# Patient Record
Sex: MALE | Race: WHITE | NOT HISPANIC OR LATINO | Employment: OTHER | URBAN - NONMETROPOLITAN AREA
[De-identification: names, ages, dates, MRNs, and addresses within clinical notes are randomized per-mention and may not be internally consistent; named-entity substitution may affect disease eponyms.]

---

## 2020-02-19 ENCOUNTER — OFFICE VISIT (OUTPATIENT)
Dept: CARDIOLOGY | Facility: CLINIC | Age: 55
End: 2020-02-19

## 2020-02-19 VITALS
HEART RATE: 60 BPM | BODY MASS INDEX: 33.08 KG/M2 | HEIGHT: 72 IN | SYSTOLIC BLOOD PRESSURE: 126 MMHG | RESPIRATION RATE: 16 BRPM | WEIGHT: 244.2 LBS | DIASTOLIC BLOOD PRESSURE: 79 MMHG

## 2020-02-19 DIAGNOSIS — R00.2 PALPITATIONS: Primary | ICD-10-CM

## 2020-02-19 DIAGNOSIS — R06.09 DYSPNEA ON EXERTION: ICD-10-CM

## 2020-02-19 DIAGNOSIS — R07.2 PRECORDIAL PAIN: ICD-10-CM

## 2020-02-19 PROCEDURE — 93000 ELECTROCARDIOGRAM COMPLETE: CPT | Performed by: INTERNAL MEDICINE

## 2020-02-19 PROCEDURE — 99204 OFFICE O/P NEW MOD 45 MIN: CPT | Performed by: INTERNAL MEDICINE

## 2020-02-19 RX ORDER — ASPIRIN 81 MG/1
81 TABLET, CHEWABLE ORAL DAILY
COMMUNITY

## 2020-02-19 RX ORDER — ZOLPIDEM TARTRATE 10 MG/1
10 TABLET ORAL NIGHTLY
COMMUNITY
End: 2022-03-22 | Stop reason: HOSPADM

## 2020-02-19 RX ORDER — ATENOLOL 25 MG/1
25 TABLET ORAL 2 TIMES DAILY
COMMUNITY
End: 2022-03-14 | Stop reason: ALTCHOICE

## 2020-02-19 RX ORDER — LISINOPRIL 10 MG/1
10 TABLET ORAL DAILY
COMMUNITY
End: 2020-02-19 | Stop reason: ALTCHOICE

## 2020-02-19 RX ORDER — NITROGLYCERIN 0.4 MG/1
0.4 TABLET SUBLINGUAL
COMMUNITY
End: 2022-03-14 | Stop reason: SDUPTHER

## 2020-02-19 NOTE — PROGRESS NOTES
Breezy Horn NP-C  Isa Priest  1965 02/19/2020    Dear  Breezy:    Subjective     Chief complaint: Recurrent palpitations with dizziness and increasing shortness of breath and some occasional chest pains.    Isa Priest is a 54 y.o. male with the problems as listed above, presents    History of Present Illness: Mr. De is a pleasant 53-year-old  male with no history of known coronary artery disease, has history of reportedly an episode of atrial fibrillation in 2007 at which time he was seen at hospital in [place].  He reportedly had cardiac arrest at that time and had to be resuscitated.  No further details of cardiac work-up are available to me at this time but patient apparently was told that he was okay and discharged home.  He apparently has done well since then with no further cardiac events.  However he has noticed shortness of breath since then which has reportedly gotten progressively worse recently to the point that he currently gets short of breath with mild to moderate exertion.  He denies any PND, orthopnea or pedal edema.  He denies any recent weight gain.  He also has noticed recent episodes of palpitations with rapid heartbeat associated with dizziness and one episode of sweating recently while he was driving.  He has no syncope as such.      He also has some intermittent left-sided chest pains which are mostly sharp that radiated to the left shoulder and left side of the neck.  These occur at random. He also has noticed that he would have 8 to 9 hours of sleep at night but feel tired when he wakes up in the morning.  He probably does snore as reported by his wife.  He is a non-smoker, nondiabetic and nonhypertensive.  He reportedly had cardiac catheterization in October 2018 at [facility] and [place] and was told that he did not have any blockages.  He does have a positive family history of premature coronary artery disease with his dad having had  coronary artery disease in his 50s and  in his 60s.  His grandfather reportedly  with heart attack in his 50s.  He admits to drinking 2 cups of coffee a day and occasional Coke.    Cardiac risk factors:Positive family Hx. of premature athersclerotivc disease. and Age and male gender.    Allergies   Allergen Reactions   • Penicillins Other (See Comments)     Reaction unknown, was advised by family    :    Current Outpatient Medications:   •  aspirin 81 MG chewable tablet, Chew 81 mg Daily., Disp: , Rfl:   •  atenolol (TENORMIN) 25 MG tablet, Take 25 mg by mouth 2 (Two) Times a Day., Disp: , Rfl:   •  nitroglycerin (NITROSTAT) 0.4 MG SL tablet, Place 0.4 mg under the tongue Every 5 (Five) Minutes As Needed. Take no more than 3 doses in 15 minutes., Disp: , Rfl:   •  sertraline (ZOLOFT) 50 MG tablet, Take 50 mg by mouth Daily., Disp: , Rfl:   •  ZOLPIDEM TARTRATE PO, Take 10 mg by mouth Every Night., Disp: , Rfl:     Past Medical History:   Diagnosis Date   • Anxiety    • Atrial fibrillation (CMS/HCC)    • Dizziness    • Hyperlipidemia    • Hypertension    • Insomnia    • Mitral valve prolapse    • Palpitations    • Precordial pain    • Sleep apnea      Past Surgical History:   Procedure Laterality Date   • CARDIAC CATHETERIZATION  1997    negative findings   • KNEE SURGERY     • SHOULDER SURGERY       Family History   Problem Relation Age of Onset   • Heart disease Father    • Heart attack Father    • Heart attack Maternal Grandfather    • Heart attack Paternal Grandfather      Social History     Tobacco Use   • Smoking status: Never Smoker   • Smokeless tobacco: Never Used   Substance Use Topics   • Alcohol use: Yes     Comment: social   • Drug use: Never       Review of Systems   Constitution: Positive for malaise/fatigue. Negative for chills, diaphoresis and fever.   HENT: Positive for odynophagia.    Eyes: Positive for visual disturbance.   Cardiovascular: Positive for chest pain and  "palpitations. Negative for leg swelling, orthopnea and paroxysmal nocturnal dyspnea.   Respiratory: Positive for shortness of breath. Negative for cough and hemoptysis.    Endocrine: Negative for cold intolerance and heat intolerance.   Hematologic/Lymphatic: Does not bruise/bleed easily.   Skin: Negative for rash.   Musculoskeletal: Positive for back pain. Negative for myalgias.   Gastrointestinal: Positive for abdominal pain, diarrhea and heartburn. Negative for constipation, nausea and vomiting.   Genitourinary: Negative for dysuria and hematuria.   Neurological: Positive for headaches and light-headedness. Negative for dizziness, focal weakness and numbness.   Psychiatric/Behavioral: The patient is nervous/anxious.    All other systems reviewed and are negative.      Objective   Blood pressure 126/79, pulse 60, resp. rate 16, height 182.9 cm (72\"), weight 111 kg (244 lb 3.2 oz).  Body mass index is 33.12 kg/m².        Physical Exam   Constitutional: He is oriented to person, place, and time. He appears well-developed and well-nourished.   HENT:   Mouth/Throat: Oropharynx is clear and moist.   Eyes: Pupils are equal, round, and reactive to light. EOM are normal.   Neck: Neck supple. No JVD present. No tracheal deviation present. No thyromegaly present.   Cardiovascular: Normal rate, regular rhythm, S1 normal and S2 normal. Exam reveals no gallop and no friction rub.   No murmur heard.  Pulmonary/Chest: Effort normal and breath sounds normal.   Abdominal: Soft. Bowel sounds are normal. He exhibits no mass. There is no tenderness.   Musculoskeletal: Normal range of motion. He exhibits no edema.   Lymphadenopathy:     He has no cervical adenopathy.   Neurological: He is alert and oriented to person, place, and time.   Skin: Skin is warm and dry. No rash noted.   Psychiatric: He has a normal mood and affect.       ECG 12 Lead  Date/Time: 2/19/2020 2:23 PM  Performed by: Bolivar Caldwell MD  Authorized by: Javi, " Bolivar BACA MD   Previous ECG: no previous ECG available  Rhythm: sinus rhythm  BPM: 60  Conduction: conduction normal  ST Segments: ST segments normal  T Waves: T waves normal    Clinical impression: normal ECG            Assessment/Plan :   Diagnosis Plan   1. Palpitations with rapid heartbeat.    2. Precordial pain (with some typical and some atypical features for angina pectoris)    3. Dyspnea on exertion(NYHA class II-III)       Recommendations:    Orders Placed This Encounter   Procedures   • Comprehensive Metabolic Panel   • TSH   • Magnesium   • Holter Monitor - 72 Hour Up To 21 Days and Stress echo   • ECG 12 Lead        1. We will evaluate his chest pains with a stress echo study  2. Will evaluate his palpitations with a 14-day Holter monitor.  3. I told him to try to avoid caffeinated beverages including coffee and pop.  4. Continue with aspirin and atenolol at current doses.  5. Check CMP and TSH.    Return in about 4 weeks (around 3/18/2020).    As always, Breezy  I appreciate very much the opportunity to participate in the cardiovascular care of your patients. Please do not hesitate to call me with any questions with regards to Isa Priest's evaluation and management.       With Best Regards,        Bolivar Caldwell MD, [facility]    Please note that portions of this note were completed with a voice recognition program.

## 2020-02-21 ENCOUNTER — TELEPHONE (OUTPATIENT)
Dept: CARDIOLOGY | Facility: CLINIC | Age: 55
End: 2020-02-21

## 2020-02-21 DIAGNOSIS — R00.2 PALPITATIONS: ICD-10-CM

## 2020-02-21 NOTE — TELEPHONE ENCOUNTER
14 day holter, [facility] [phone] does not require precert per Availity. Called to make pt aware-VM full, could not leave a message.  [employer] enrollment is queued.    Pt returned call. Pt aware and agreeable.

## 2020-02-24 ENCOUNTER — CLINICAL SUPPORT (OUTPATIENT)
Dept: CARDIOLOGY | Facility: CLINIC | Age: 55
End: 2020-02-24

## 2020-02-24 DIAGNOSIS — R00.2 PALPITATIONS: Primary | ICD-10-CM

## 2020-02-24 PROCEDURE — 0296T PR EXT ECG > 48HR TO 21 DAY RCRD W/CONECT INTL RCRD: CPT | Performed by: INTERNAL MEDICINE

## 2020-02-24 NOTE — PROGRESS NOTES
14 day holter monitor placement as ordered by Dr. Caldwell 2/19/20.  Pt instructions given per instructional booklet. Pt v/u and denies questions or concerns at this time.

## 2020-03-03 ENCOUNTER — HOSPITAL ENCOUNTER (OUTPATIENT)
Dept: CARDIOLOGY | Facility: HOSPITAL | Age: 55
Discharge: HOME | End: 2020-03-03

## 2020-03-03 DIAGNOSIS — R07.2 PRECORDIAL PAIN: ICD-10-CM

## 2020-03-03 PROCEDURE — 93350 STRESS TTE ONLY: CPT | Performed by: INTERNAL MEDICINE

## 2020-03-03 PROCEDURE — 93320 DOPPLER ECHO COMPLETE: CPT | Performed by: INTERNAL MEDICINE

## 2020-03-03 PROCEDURE — 93325 DOPPLER ECHO COLOR FLOW MAPG: CPT

## 2020-03-03 PROCEDURE — 93018 CV STRESS TEST I&R ONLY: CPT | Performed by: INTERNAL MEDICINE

## 2020-03-03 PROCEDURE — 93351 STRESS TTE COMPLETE: CPT

## 2020-03-03 PROCEDURE — 93325 DOPPLER ECHO COLOR FLOW MAPG: CPT | Performed by: INTERNAL MEDICINE

## 2020-03-03 PROCEDURE — 93320 DOPPLER ECHO COMPLETE: CPT

## 2020-03-05 LAB
BH CV ECHO MEAS - ACS: 2.1 CM
BH CV ECHO MEAS - AO ROOT AREA (BSA CORRECTED): 1.5
BH CV ECHO MEAS - AO ROOT AREA: 9.1 CM^2
BH CV ECHO MEAS - AO ROOT DIAM: 3.4 CM
BH CV ECHO MEAS - BSA(HAYCOCK): 2.4 M^2
BH CV ECHO MEAS - BSA: 2.3 M^2
BH CV ECHO MEAS - BZI_BMI: 33.1 KILOGRAMS/M^2
BH CV ECHO MEAS - BZI_METRIC_HEIGHT: 182.9 CM
BH CV ECHO MEAS - BZI_METRIC_WEIGHT: 110.7 KG
BH CV ECHO MEAS - EDV(CUBED): 70.4 ML
BH CV ECHO MEAS - EDV(MOD-SP4): 61.7 ML
BH CV ECHO MEAS - EDV(TEICH): 75.5 ML
BH CV ECHO MEAS - EF(CUBED): 12.5 %
BH CV ECHO MEAS - EF(MOD-SP4): 70.2 %
BH CV ECHO MEAS - EF(TEICH): 10 %
BH CV ECHO MEAS - ESV(CUBED): 61.6 ML
BH CV ECHO MEAS - ESV(MOD-SP4): 18.4 ML
BH CV ECHO MEAS - ESV(TEICH): 67.9 ML
BH CV ECHO MEAS - FS: 4.4 %
BH CV ECHO MEAS - IVS/LVPW: 0.86
BH CV ECHO MEAS - IVSD: 1 CM
BH CV ECHO MEAS - LA DIMENSION: 4.7 CM
BH CV ECHO MEAS - LA/AO: 1.4
BH CV ECHO MEAS - LV DIASTOLIC VOL/BSA (35-75): 26.6 ML/M^2
BH CV ECHO MEAS - LV MASS(C)D: 148.8 GRAMS
BH CV ECHO MEAS - LV MASS(C)DI: 64.2 GRAMS/M^2
BH CV ECHO MEAS - LV SYSTOLIC VOL/BSA (12-30): 7.9 ML/M^2
BH CV ECHO MEAS - LVIDD: 4.1 CM
BH CV ECHO MEAS - LVIDS: 4 CM
BH CV ECHO MEAS - LVLD AP4: 9.6 CM
BH CV ECHO MEAS - LVLS AP4: 8.2 CM
BH CV ECHO MEAS - LVOT AREA (M): 3.5 CM^2
BH CV ECHO MEAS - LVOT AREA: 3.5 CM^2
BH CV ECHO MEAS - LVOT DIAM: 2.1 CM
BH CV ECHO MEAS - LVPWD: 1.2 CM
BH CV ECHO MEAS - MV A MAX VEL: 72.8 CM/SEC
BH CV ECHO MEAS - MV E MAX VEL: 59.1 CM/SEC
BH CV ECHO MEAS - MV E/A: 0.81
BH CV ECHO MEAS - PA ACC TIME: 0.11 SEC
BH CV ECHO MEAS - PA PR(ACCEL): 31.3 MMHG
BH CV ECHO MEAS - SI(CUBED): 3.8 ML/M^2
BH CV ECHO MEAS - SI(MOD-SP4): 18.7 ML/M^2
BH CV ECHO MEAS - SI(TEICH): 3.3 ML/M^2
BH CV ECHO MEAS - SV(CUBED): 8.8 ML
BH CV ECHO MEAS - SV(MOD-SP4): 43.3 ML
BH CV ECHO MEAS - SV(TEICH): 7.6 ML
BH CV STRESS BP STAGE 1: NORMAL
BH CV STRESS BP STAGE 2: NORMAL
BH CV STRESS BP STAGE 3: NORMAL
BH CV STRESS DURATION MIN STAGE 1: 3
BH CV STRESS DURATION MIN STAGE 2: 3
BH CV STRESS DURATION MIN STAGE 3: 3
BH CV STRESS DURATION SEC STAGE 1: 0
BH CV STRESS DURATION SEC STAGE 2: 0
BH CV STRESS DURATION SEC STAGE 3: 0
BH CV STRESS ECHO POST STRESS EJECTION FRACTION EF: 75 %
BH CV STRESS GRADE STAGE 1: 10
BH CV STRESS GRADE STAGE 2: 12
BH CV STRESS GRADE STAGE 3: 14
BH CV STRESS HR STAGE 1: 106
BH CV STRESS HR STAGE 2: 128
BH CV STRESS HR STAGE 3: 144
BH CV STRESS METS STAGE 1: 5
BH CV STRESS METS STAGE 2: 7.5
BH CV STRESS METS STAGE 3: 10
BH CV STRESS PROTOCOL 1: NORMAL
BH CV STRESS RECOVERY BP: NORMAL MMHG
BH CV STRESS RECOVERY HR: 82 BPM
BH CV STRESS SPEED STAGE 1: 1.7
BH CV STRESS SPEED STAGE 2: 2.5
BH CV STRESS SPEED STAGE 3: 3.4
BH CV STRESS STAGE 1: 1
BH CV STRESS STAGE 2: 2
BH CV STRESS STAGE 3: 3
MAXIMAL PREDICTED HEART RATE: 165 BPM
PERCENT MAX PREDICTED HR: 87.27 %
STRESS BASELINE BP: NORMAL MMHG
STRESS BASELINE HR: 83 BPM
STRESS PERCENT HR: 103 %
STRESS POST ESTIMATED WORKLOAD: 10.1 METS
STRESS POST EXERCISE DUR MIN: 7 MIN
STRESS POST EXERCISE DUR SEC: 30 SEC
STRESS POST PEAK BP: NORMAL MMHG
STRESS POST PEAK HR: 144 BPM
STRESS TARGET HR: 140 BPM

## 2020-03-17 ENCOUNTER — TREATMENT (OUTPATIENT)
Dept: CARDIOLOGY | Facility: CLINIC | Age: 55
End: 2020-03-17

## 2020-03-17 DIAGNOSIS — R00.2 PALPITATIONS: Primary | ICD-10-CM

## 2020-03-17 PROCEDURE — 0298T PR EXT ECG > 48HR TO 21 DAY REVIEW AND INTERPRETATN: CPT | Performed by: INTERNAL MEDICINE

## 2020-09-15 ENCOUNTER — OFFICE VISIT (OUTPATIENT)
Dept: CARDIOLOGY | Facility: CLINIC | Age: 55
End: 2020-09-15

## 2020-09-15 VITALS
WEIGHT: 247 LBS | HEIGHT: 72 IN | BODY MASS INDEX: 33.46 KG/M2 | TEMPERATURE: 99 F | OXYGEN SATURATION: 96 % | HEART RATE: 66 BPM | SYSTOLIC BLOOD PRESSURE: 159 MMHG | DIASTOLIC BLOOD PRESSURE: 83 MMHG

## 2020-09-15 DIAGNOSIS — R55 SYNCOPE, UNSPECIFIED SYNCOPE TYPE: ICD-10-CM

## 2020-09-15 DIAGNOSIS — I10 ESSENTIAL HYPERTENSION: ICD-10-CM

## 2020-09-15 DIAGNOSIS — R00.2 PALPITATIONS: Primary | ICD-10-CM

## 2020-09-15 PROCEDURE — 99214 OFFICE O/P EST MOD 30 MIN: CPT | Performed by: NURSE PRACTITIONER

## 2020-09-15 PROCEDURE — 93000 ELECTROCARDIOGRAM COMPLETE: CPT | Performed by: NURSE PRACTITIONER

## 2020-09-15 NOTE — PROGRESS NOTES
Breezy Horn NP-C  Asim Priest  1965  09/15/2020    There is no problem list on file for this patient.      Dear Breezy Horn NP-C:    Subjective     Chief Complaint   Patient presents with   • Follow-up     6 mths f/up.    • Med Management   • Dizziness   • Palpitations   • Syncope           History of Present Illness:    Asim Priest is a 55 y.o. male with a past medical history significant for hypertension, reported history of atrial fibrillation, and recent near syncopal and syncopal episodes.  He was evaluated further with a stress echocardiogram which revealed normal LVEF with no significant valvular abnormality.  EKG stress portion did reveal 1 mm ST depression in the inferior lateral leads but the stress echo portion did not reveal any echocardiographic evidence of myocardial ischemia.  The patient also reports he had a completely unremarkable cardiac catheterization in 2018 and [place] at [facility].  He has been having near syncopal episodes.  These seem to occur with no exacerbating factor.  He feels dizzy and feels as if he is going to pass out.  He also feels his heart racing at the time.  He does report one episode of syncope in March 2020.  He did wear a 14-day Holter monitor which revealed predominantly normal sinus rhythm with one 5 beat run of ventricular tachycardia.  However, he reports he did not have any symptoms while wearing the monitor.  He has had a few episodes of near syncope and dizziness associated with rapid heartbeat since that time.  TSH, magnesium level, and BMP were ordered but not completed.          Allergies   Allergen Reactions   • Penicillins Other (See Comments)     Reaction unknown, was advised by family    :      Current Outpatient Medications:   •  aspirin 81 MG chewable tablet, Chew 81 mg Daily., Disp: , Rfl:   •  atenolol (TENORMIN) 25 MG tablet, Take 25 mg by mouth 2 (Two) Times a Day., Disp: , Rfl:   •  nitroglycerin  "(NITROSTAT) 0.4 MG SL tablet, Place 0.4 mg under the tongue Every 5 (Five) Minutes As Needed. Take no more than 3 doses in 15 minutes., Disp: , Rfl:   •  sertraline (ZOLOFT) 50 MG tablet, Take 50 mg by mouth Daily., Disp: , Rfl:   •  ZOLPIDEM TARTRATE PO, Take 10 mg by mouth Every Night., Disp: , Rfl:       The following portions of the patient's history were reviewed and updated as appropriate: allergies, current medications, past family history, past medical history, past social history, past surgical history and problem list.    Social History     Tobacco Use   • Smoking status: Never Smoker   • Smokeless tobacco: Never Used   Substance Use Topics   • Alcohol use: Yes     Comment: social   • Drug use: Never       Review of Systems   Constitution: Negative for decreased appetite and malaise/fatigue.   Cardiovascular: Positive for near-syncope, palpitations and syncope. Negative for chest pain, dyspnea on exertion, irregular heartbeat, leg swelling, orthopnea and paroxysmal nocturnal dyspnea.   Respiratory: Negative for cough, shortness of breath and wheezing.    Neurological: Negative for dizziness, light-headedness and weakness.       Objective   Vitals:    09/15/20 1520   BP: 159/83   BP Location: Left arm   Patient Position: Sitting   Cuff Size: Adult   Pulse: 66   Temp: 99 °F (37.2 °C)   TempSrc: Infrared   SpO2: 96%   Weight: 112 kg (247 lb)   Height: 182.9 cm (72\")     Body mass index is 33.5 kg/m².        Vitals signs reviewed.   Constitutional:       Appearance: Healthy appearance. Well-developed and not in distress.   HENT:      Head: Normocephalic and atraumatic.   Neck:      Vascular: JVD normal.   Pulmonary:      Effort: Pulmonary effort is normal.      Breath sounds: Normal breath sounds. No wheezing. No rales.   Cardiovascular:      Normal rate. Regular rhythm.      Murmurs: There is no murmur.      . No S3 and S4 gallop.   Edema:     Peripheral edema absent.   Abdominal:      General: Bowel sounds " are normal.      Palpations: Abdomen is soft.   Skin:     General: Skin is warm and dry.   Neurological:      Mental Status: Alert, oriented to person, place, and time and oriented to person, place and time.   Psychiatric:         Behavior: Behavior normal.                 ECG 12 Lead    Date/Time: 9/15/2020 3:13 PM  Performed by: Katie Mandel APRN  Authorized by: Katie Mandel APRN   Comparison: compared with previous ECG   Similar to previous ECG  Rhythm: sinus rhythm  BPM: 69                Assessment/Plan    Diagnosis Plan   1. Palpitations  Cardiac Event Monitor   2. Syncope, unspecified syncope type  Cardiac Event Monitor   3. Essential hypertension                  Recommendations:    1.  I have reviewed the stress echo and 14-day Holter results with the patient today.    2.  Since he continues to have near syncopal episodes and palpitations, will evaluate further with a 30-day event monitor.    3.  I have also asked him to complete the BMP, magnesium, and TSH level previously ordered.    4.  Continue with atenolol at the current dose.  He will monitor his blood pressure at home.    5.  Follow-up in 6 weeks or sooner if needed.      Return in about 6 weeks (around 10/27/2020) for Recheck.    As always, I appreciate very much the opportunity to participate in the cardiovascular care of your patients.      With Best Regards,    INDY Maldonado

## 2020-09-16 ENCOUNTER — TELEPHONE (OUTPATIENT)
Dept: CARDIOLOGY | Facility: CLINIC | Age: 55
End: 2020-09-16

## 2020-09-16 NOTE — TELEPHONE ENCOUNTER
Called pt to advise them they can come by the office any day in between 8 am and 11 am to have there monitor put on or we can mail it. If they want it mailed please confirm their address.     Advised pt and he will come in office.

## 2020-10-08 ENCOUNTER — TELEPHONE (OUTPATIENT)
Dept: CARDIOLOGY | Facility: CLINIC | Age: 55
End: 2020-10-08

## 2020-11-20 ENCOUNTER — TELEPHONE (OUTPATIENT)
Dept: CARDIOLOGY | Facility: CLINIC | Age: 55
End: 2020-11-20

## 2020-11-20 NOTE — TELEPHONE ENCOUNTER
Called pt to see if was going to have the monitor done. He stated that he is feeling a lot better with the med change and wants to hold off on the monitor for now.

## 2021-02-06 ENCOUNTER — HOSPITAL ENCOUNTER (EMERGENCY)
Dept: HOSPITAL 79 - ER1 | Age: 56
Discharge: LEFT BEFORE DISCHARGE | End: 2021-02-06
Payer: COMMERCIAL

## 2021-02-06 VITALS — WEIGHT: 241 LBS | BODY MASS INDEX: 32.64 KG/M2 | HEIGHT: 72 IN

## 2021-02-06 DIAGNOSIS — Z88.8: ICD-10-CM

## 2021-02-06 DIAGNOSIS — Z53.20: ICD-10-CM

## 2021-02-06 DIAGNOSIS — Z88.0: ICD-10-CM

## 2021-02-06 DIAGNOSIS — R07.89: Primary | ICD-10-CM

## 2021-02-06 DIAGNOSIS — I48.91: ICD-10-CM

## 2021-02-06 DIAGNOSIS — R06.02: ICD-10-CM

## 2021-02-06 DIAGNOSIS — R11.0: ICD-10-CM

## 2021-02-06 DIAGNOSIS — R00.2: ICD-10-CM

## 2021-02-06 DIAGNOSIS — I10: ICD-10-CM

## 2021-02-06 DIAGNOSIS — Z20.822: ICD-10-CM

## 2021-02-06 DIAGNOSIS — R42: ICD-10-CM

## 2021-02-06 LAB
BUN/CREATININE RATIO: 14 (ref 0–10)
HGB BLD-MCNC: 16.1 GM/DL (ref 14–17.5)
RED BLOOD COUNT: 5.07 M/UL (ref 4.2–5.5)
WHITE BLOOD COUNT: 7.9 K/UL (ref 4.5–11)

## 2021-02-06 PROCEDURE — U0002 COVID-19 LAB TEST NON-CDC: HCPCS

## 2022-03-09 NOTE — PROGRESS NOTES
[facility] Cardiology  [address], [address]  [place], [place], [address] [phone]  [email or web address]           OUTPATIENT CLINIC CONSULTATION NOTE    Patient care team:  Patient Care Team:  Breezy Horn NP-C as PCP - General (Nurse Practitioner)  Bertram Coley MD as Consulting Physician (Cardiology)    Requesting Provider and Reason for consultation: The patient is being seen today at the request of FIDENCIO Blancas for hypertension, Afib.     Subjective:   Chief complaint:   Chief Complaint   Patient presents with   • Hypertension       HPI:    Asim Priest is a 56 y.o. male.  Cardiac focused problem list:  1. Atrial fibrillation  a. 1 transient episode  that was associated with syncope and reportedly had a cardiac arrest and needed to be resuscitated  b. Heart monitor in [place] in 2018 with no known results  2. History of remote heart catheterization approximately , reportedly negative, data deficit  3. Family history of premature CAD with his father having his first MI in his 50s, Grandfather  of heart attack in his 50s, uncles with premature CAD  4. Hypertension  5. Syncope  6. Generalized anxiety disorder  7. Insomnia  8. Covid infection 2022    Patient presents today for consultation.     Since having Covid in 2022, he has experienced increased mild to moderate palpitations and fatigue.  Associated with emotional stress.  Occasional left and central sided chest pains that lasts a few minutes.  Denies tobacco use.  Drinks 4-5 alcoholic drinks a day several days a week.  Does not exercise regularly.    Review of Systems:  As noted above in the HPI    PFSH:  There is no problem list on file for this patient.        Current Outpatient Medications:   •  aspirin 81 MG chewable tablet, Chew 81 mg Daily., Disp: , Rfl:   •  metoprolol succinate XL (TOPROL-XL) 50 MG 24 hr tablet, Take 1 tablet by mouth Daily., Disp: , Rfl:   •   "nitroglycerin (NITROSTAT) 0.4 MG SL tablet, Place 1 tablet under the tongue Every 5 (Five) Minutes As Needed (chest pain). Take no more than 3 doses in 15 minutes., Disp: 25 tablet, Rfl: 3  •  sertraline (ZOLOFT) 50 MG tablet, Take 50 mg by mouth Daily., Disp: , Rfl:   •  ZOLPIDEM TARTRATE PO, Take 10 mg by mouth Every Night., Disp: , Rfl:     Allergies   Allergen Reactions   • Penicillins Other (See Comments)     Reaction unknown, was advised by family        Social History     Socioeconomic History   • Marital status:    Tobacco Use   • Smoking status: Never Smoker   • Smokeless tobacco: Never Used   Substance and Sexual Activity   • Alcohol use: Yes     Comment: social   • Drug use: Never     Family History   Problem Relation Age of Onset   • Arrhythmia Mother    • Heart disease Father    • Heart attack Father    • Heart attack Maternal Grandfather    • Heart attack Paternal Grandfather          Objective:   Physical Exam:  /82 (BP Location: Left arm, Patient Position: Sitting)   Pulse 67   Ht 182.9 cm (72\")   Wt 111 kg (244 lb)   SpO2 98%   BMI 33.09 kg/m²   CONSTITUTIONAL: No acute distress  RESPIRATORY: Normal effort. Clear to auscultation bilaterally without wheezing or rales  CARDIOVASCULAR: Regular rate and rhythm with normal S1 and S2. Without murmur, gallop or rub.  PERIPHERAL VASCULAR: Normal radial pulse. There is no lower extremity edema bilaterally.      3/2022 exam: 2+ PT pulses bilaterally.      Labs:  Labs reviewed by myself  No results found for: BUN, CREATININE, K, ALT, AST    No results found for: CHOL  No results found for: TRIG  No results found for: HDL  No results found for: LDL  No components found for: LDLDIRECTC    Diagnostic Data:      ECG 12 Lead    Date/Time: 3/14/2022 12:05 PM  Performed by: Bertram Coley MD  Authorized by: Bertram Coley MD   Comparison: compared with previous ECG from 9/15/2020  Similar to previous ECG  Rhythm: sinus rhythm            Results for " orders placed during the hospital encounter of 03/03/20    Adult Stress Echo W/ Cont or Stress Agent if Necessary Per Protocol    Interpretation Summary  · Resting Echocardiogram Findings:  · Normal left ventricular cavity size and wall thickness noted. All left ventricular wall segments contract normally.  · Estimated EF appears to be in the range of 61 - 65%.  · The valve appears trileaflet. The aortic valve is abnormal in structure. The valve exhibits sclerosis. No aortic valve regurgitation is present. No aortic valve stenosis is present  · The mitral valve is normal in structure. No mitral valve regurgitation is present. No significant mitral valve stenosis is present.  · Tricuspid valve not well visualized. The tricuspid valve is grossly normal. No tricuspid valve regurgitation is present.  · There is no evidence of pericardial effusion.  · Stress Procedure:  · A stress test was performed following the Omi protocol.  · Exercise duration (min) 7 min Exercise duration (sec) 30 sec Estimated workload 10.1 METS  · Baseline Vitals Baseline HR 83 bpm Baseline /98 mmHg Peak Stress Vitals Peak  bpm Peak /92 mmHg Recovery Vitals Recovery HR 82 bpm Recovery /102 mmHg Exercise Data Target HR (85%) 140 bpm Max. Pred. HR (100%) 165 bpm Percent Max Pred HR 87.27 %  · A horizontal ST segment depression of 1 mm in the inferolateral leads was noted during stress (II, III, aVF, V5, V6, V4 and V3), and returning to baseline after less than 1 minute of recovery.  · There were no arrhythmias during stress.  · Abnormal with interpretable ST segment stress ECG interpretation.  · Stress Echo Findings:  · Segment augmentation had a normal response to stress  · Cavity size behaved normally in response to stress. Left ventricular function is normal. Septal wall motion is normal  · Normal stress echo with no significant echocardiographic evidence for myocardial ischemia.    Stress test 3/2020  · Resting  Echocardiogram Findings:  · Normal left ventricular cavity size and wall thickness noted. All left ventricular wall segments contract normally.  · Estimated EF appears to be in the range of 61 - 65%.  · The valve appears trileaflet. The aortic valve is abnormal in structure. The valve exhibits sclerosis. No aortic valve regurgitation is present. No aortic valve stenosis is present  · The mitral valve is normal in structure. No mitral valve regurgitation is present. No significant mitral valve stenosis is present.  · Tricuspid valve not well visualized. The tricuspid valve is grossly normal. No tricuspid valve regurgitation is present.  · There is no evidence of pericardial effusion.  · Stress Procedure:  · A stress test was performed following the Omi protocol.  · Exercise duration (min) 7 min Exercise duration (sec) 30 sec Estimated workload 10.1 METS  · Baseline Vitals Baseline HR 83 bpm Baseline /98 mmHg Peak Stress Vitals Peak  bpm Peak /92 mmHg Recovery Vitals Recovery HR 82 bpm Recovery /102 mmHg Exercise Data Target HR (85%) 140 bpm Max. Pred. HR (100%) 165 bpm Percent Max Pred HR 87.27 %  · A horizontal ST segment depression of 1 mm in the inferolateral leads was noted during stress (II, III, aVF, V5, V6, V4 and V3), and returning to baseline after less than 1 minute of recovery.  · There were no arrhythmias during stress.  · Abnormal with interpretable ST segment stress ECG interpretation.  · Stress Echo Findings:  · Segment augmentation had a normal response to stress  · Cavity size behaved normally in response to stress. Left ventricular function is normal. Septal wall motion is normal  · Normal stress echo with no significant echocardiographic evidence for myocardial ischemia.    Holter monitor 2/2020  · Predominantly sinus rhythm with heart rate ranging from  bpm.  · Occasional PVCs with one 5 beat run of VT at a rate of 150 bpm on 3/04/2020 at 3:04 AM.  · Occasional  PACs.  No SVT.  · No significant AV block or long pauses.    Assessment and Plan:     Isolated episode of atrial fibrillation, 2007  Palpitations  -14-day heart monitor  -We will obtain most recent labs from PCP drawn a month ago  -Advised increased exercise, decreased caffeine and sugar, decreased alcohol use  -Continue metoprolol    Precordial pain  -Multiple risk factors for CAD  -Exercise nuclear stress test for ongoing CCS class II-III chest pain  -Continue aspirin, metoprolol.  Refilled nitro sublingual as needed    - Return in about 6 months (around 9/14/2022) for Next scheduled follow up, or sooner if needed.      Electronically signed by Bertram Cloey MD, 03/14/22, 12:05 PM EDT.

## 2022-03-14 ENCOUNTER — OFFICE VISIT (OUTPATIENT)
Dept: CARDIOLOGY | Facility: CLINIC | Age: 57
End: 2022-03-14

## 2022-03-14 ENCOUNTER — PATIENT ROUNDING (BHMG ONLY) (OUTPATIENT)
Dept: CARDIOLOGY | Facility: CLINIC | Age: 57
End: 2022-03-14

## 2022-03-14 VITALS
BODY MASS INDEX: 33.05 KG/M2 | HEIGHT: 72 IN | SYSTOLIC BLOOD PRESSURE: 138 MMHG | OXYGEN SATURATION: 98 % | WEIGHT: 244 LBS | HEART RATE: 67 BPM | DIASTOLIC BLOOD PRESSURE: 82 MMHG

## 2022-03-14 DIAGNOSIS — I48.0 PAROXYSMAL ATRIAL FIBRILLATION: Primary | ICD-10-CM

## 2022-03-14 DIAGNOSIS — R07.2 PRECORDIAL PAIN: ICD-10-CM

## 2022-03-14 DIAGNOSIS — R00.2 PALPITATIONS: ICD-10-CM

## 2022-03-14 PROCEDURE — 99214 OFFICE O/P EST MOD 30 MIN: CPT | Performed by: INTERNAL MEDICINE

## 2022-03-14 PROCEDURE — 93000 ELECTROCARDIOGRAM COMPLETE: CPT | Performed by: INTERNAL MEDICINE

## 2022-03-14 RX ORDER — METOPROLOL SUCCINATE 50 MG/1
50 TABLET, EXTENDED RELEASE ORAL DAILY
COMMUNITY
Start: 2022-03-07

## 2022-03-14 RX ORDER — NITROGLYCERIN 0.4 MG/1
0.4 TABLET SUBLINGUAL
Qty: 25 TABLET | Refills: 3 | Status: SHIPPED | OUTPATIENT
Start: 2022-03-14

## 2022-03-14 NOTE — PROGRESS NOTES
"March 14, 2022    Hello, may I speak with Asim Priest?  \"Yes\"    My name is Fani DEL CASTILLO.     I am  with [facility] CARDIOLOGY  [address]  [place] [address] [phone].    Before we get started may I verify your date of birth? 1965, \"Yes, correct\"    I am calling to officially welcome you to our practice and ask about your recent visit. Is this a good time to talk? \"Yes\"    Tell me about your visit with us. What things went well?  \"Everything\"       We're always looking for ways to make our patients' experiences even better. Do you have recommendations on ways we may improve?  \"No, doing a good job with everything.\"    Overall were you satisfied with your first visit to our practice? \"Yes\"       I appreciate you taking the time to speak with me today. Is there anything else I can do for you? \"No\"      Thank you, and have a great day.      "

## 2022-03-21 ENCOUNTER — HOSPITAL ENCOUNTER (INPATIENT)
Facility: HOSPITAL | Age: 57
LOS: 1 days | Discharge: HOME | End: 2022-03-22
Attending: EMERGENCY MEDICINE | Admitting: INTERNAL MEDICINE

## 2022-03-21 ENCOUNTER — APPOINTMENT (OUTPATIENT)
Dept: GENERAL RADIOLOGY | Facility: HOSPITAL | Age: 57
End: 2022-03-21

## 2022-03-21 DIAGNOSIS — R77.8 ELEVATED TROPONIN: ICD-10-CM

## 2022-03-21 DIAGNOSIS — I48.0 PAROXYSMAL ATRIAL FIBRILLATION WITH RAPID VENTRICULAR RESPONSE: Primary | ICD-10-CM

## 2022-03-21 DIAGNOSIS — Z86.79 HISTORY OF HYPERTENSION: ICD-10-CM

## 2022-03-21 DIAGNOSIS — R07.9 CHEST PAIN, UNSPECIFIED TYPE: ICD-10-CM

## 2022-03-21 DIAGNOSIS — R55 NEAR SYNCOPE: ICD-10-CM

## 2022-03-21 DIAGNOSIS — Z86.39 HISTORY OF HYPERLIPIDEMIA: ICD-10-CM

## 2022-03-21 PROBLEM — I10 HTN (HYPERTENSION): Status: ACTIVE | Noted: 2022-03-21

## 2022-03-21 PROBLEM — R79.89 ELEVATED TROPONIN: Status: ACTIVE | Noted: 2022-03-21

## 2022-03-21 PROBLEM — I47.10 SVT (SUPRAVENTRICULAR TACHYCARDIA): Status: ACTIVE | Noted: 2022-03-21

## 2022-03-21 PROBLEM — R09.02 HYPOXIA: Status: ACTIVE | Noted: 2022-03-21

## 2022-03-21 PROBLEM — E78.5 HLD (HYPERLIPIDEMIA): Status: ACTIVE | Noted: 2022-03-21

## 2022-03-21 PROBLEM — I47.1 SVT (SUPRAVENTRICULAR TACHYCARDIA): Status: ACTIVE | Noted: 2022-03-21

## 2022-03-21 PROBLEM — I48.91 A-FIB: Status: ACTIVE | Noted: 2022-03-21

## 2022-03-21 PROBLEM — G47.33 OSA (OBSTRUCTIVE SLEEP APNEA): Status: ACTIVE | Noted: 2022-03-21

## 2022-03-21 LAB
ALBUMIN SERPL-MCNC: 4.8 G/DL (ref 3.5–5.2)
ALBUMIN/GLOB SERPL: 1.9 G/DL
ALP SERPL-CCNC: 81 U/L (ref 39–117)
ALT SERPL W P-5'-P-CCNC: 31 U/L (ref 1–41)
ANION GAP SERPL CALCULATED.3IONS-SCNC: 12 MMOL/L (ref 5–15)
AST SERPL-CCNC: 24 U/L (ref 1–40)
BASOPHILS # BLD AUTO: 0.05 10*3/MM3 (ref 0–0.2)
BASOPHILS NFR BLD AUTO: 0.5 % (ref 0–1.5)
BILIRUB SERPL-MCNC: 0.5 MG/DL (ref 0–1.2)
BUN SERPL-MCNC: 13 MG/DL (ref 6–20)
BUN/CREAT SERPL: 13 (ref 7–25)
CALCIUM SPEC-SCNC: 9.5 MG/DL (ref 8.6–10.5)
CHLORIDE SERPL-SCNC: 102 MMOL/L (ref 98–107)
CO2 SERPL-SCNC: 22 MMOL/L (ref 22–29)
CREAT SERPL-MCNC: 1 MG/DL (ref 0.76–1.27)
DEPRECATED RDW RBC AUTO: 41.8 FL (ref 37–54)
EGFRCR SERPLBLD CKD-EPI 2021: 87.8 ML/MIN/1.73
EOSINOPHIL # BLD AUTO: 0.09 10*3/MM3 (ref 0–0.4)
EOSINOPHIL NFR BLD AUTO: 0.9 % (ref 0.3–6.2)
ERYTHROCYTE [DISTWIDTH] IN BLOOD BY AUTOMATED COUNT: 13.2 % (ref 12.3–15.4)
FLUAV RNA RESP QL NAA+PROBE: NOT DETECTED
FLUBV RNA RESP QL NAA+PROBE: NOT DETECTED
GLOBULIN UR ELPH-MCNC: 2.5 GM/DL
GLUCOSE SERPL-MCNC: 109 MG/DL (ref 65–99)
HCT VFR BLD AUTO: 43.1 % (ref 37.5–51)
HGB BLD-MCNC: 15 G/DL (ref 13–17.7)
HOLD SPECIMEN: NORMAL
IMM GRANULOCYTES # BLD AUTO: 0.03 10*3/MM3 (ref 0–0.05)
IMM GRANULOCYTES NFR BLD AUTO: 0.3 % (ref 0–0.5)
LIPASE SERPL-CCNC: 27 U/L (ref 13–60)
LYMPHOCYTES # BLD AUTO: 1.45 10*3/MM3 (ref 0.7–3.1)
LYMPHOCYTES NFR BLD AUTO: 15.3 % (ref 19.6–45.3)
MCH RBC QN AUTO: 30.5 PG (ref 26.6–33)
MCHC RBC AUTO-ENTMCNC: 34.8 G/DL (ref 31.5–35.7)
MCV RBC AUTO: 87.6 FL (ref 79–97)
MONOCYTES # BLD AUTO: 0.48 10*3/MM3 (ref 0.1–0.9)
MONOCYTES NFR BLD AUTO: 5.1 % (ref 5–12)
NEUTROPHILS NFR BLD AUTO: 7.39 10*3/MM3 (ref 1.7–7)
NEUTROPHILS NFR BLD AUTO: 77.9 % (ref 42.7–76)
NRBC BLD AUTO-RTO: 0 /100 WBC (ref 0–0.2)
NT-PROBNP SERPL-MCNC: 132.8 PG/ML (ref 0–900)
PLATELET # BLD AUTO: 166 10*3/MM3 (ref 140–450)
PMV BLD AUTO: 9.4 FL (ref 6–12)
POTASSIUM SERPL-SCNC: 4.1 MMOL/L (ref 3.5–5.2)
PROT SERPL-MCNC: 7.3 G/DL (ref 6–8.5)
QT INTERVAL: 238 MS
QT INTERVAL: 348 MS
QTC INTERVAL: 431 MS
QTC INTERVAL: 462 MS
RBC # BLD AUTO: 4.92 10*6/MM3 (ref 4.14–5.8)
SARS-COV-2 RNA RESP QL NAA+PROBE: NOT DETECTED
SODIUM SERPL-SCNC: 136 MMOL/L (ref 136–145)
TROPONIN T SERPL-MCNC: 0.05 NG/ML (ref 0–0.03)
TROPONIN T SERPL-MCNC: 0.12 NG/ML (ref 0–0.03)
WBC NRBC COR # BLD: 9.49 10*3/MM3 (ref 3.4–10.8)
WHOLE BLOOD HOLD SPECIMEN: NORMAL
WHOLE BLOOD HOLD SPECIMEN: NORMAL

## 2022-03-21 PROCEDURE — 25010000002 ENOXAPARIN PER 10 MG: Performed by: EMERGENCY MEDICINE

## 2022-03-21 PROCEDURE — 85025 COMPLETE CBC W/AUTO DIFF WBC: CPT | Performed by: EMERGENCY MEDICINE

## 2022-03-21 PROCEDURE — 93005 ELECTROCARDIOGRAM TRACING: CPT | Performed by: NURSE PRACTITIONER

## 2022-03-21 PROCEDURE — 83880 ASSAY OF NATRIURETIC PEPTIDE: CPT | Performed by: EMERGENCY MEDICINE

## 2022-03-21 PROCEDURE — 80053 COMPREHEN METABOLIC PANEL: CPT | Performed by: EMERGENCY MEDICINE

## 2022-03-21 PROCEDURE — 93005 ELECTROCARDIOGRAM TRACING: CPT | Performed by: EMERGENCY MEDICINE

## 2022-03-21 PROCEDURE — 92960 CARDIOVERSION ELECTRIC EXT: CPT

## 2022-03-21 PROCEDURE — 5A2204Z RESTORATION OF CARDIAC RHYTHM, SINGLE: ICD-10-PCS | Performed by: INTERNAL MEDICINE

## 2022-03-21 PROCEDURE — 83690 ASSAY OF LIPASE: CPT | Performed by: EMERGENCY MEDICINE

## 2022-03-21 PROCEDURE — 99223 1ST HOSP IP/OBS HIGH 75: CPT | Performed by: INTERNAL MEDICINE

## 2022-03-21 PROCEDURE — 84484 ASSAY OF TROPONIN QUANT: CPT | Performed by: EMERGENCY MEDICINE

## 2022-03-21 PROCEDURE — 99284 EMERGENCY DEPT VISIT MOD MDM: CPT

## 2022-03-21 PROCEDURE — 71045 X-RAY EXAM CHEST 1 VIEW: CPT

## 2022-03-21 PROCEDURE — 87636 SARSCOV2 & INF A&B AMP PRB: CPT | Performed by: INTERNAL MEDICINE

## 2022-03-21 PROCEDURE — 84484 ASSAY OF TROPONIN QUANT: CPT | Performed by: NURSE PRACTITIONER

## 2022-03-21 RX ORDER — METOPROLOL SUCCINATE 50 MG/1
50 TABLET, EXTENDED RELEASE ORAL DAILY
Status: DISCONTINUED | OUTPATIENT
Start: 2022-03-22 | End: 2022-03-22 | Stop reason: HOSPADM

## 2022-03-21 RX ORDER — METOPROLOL TARTRATE 5 MG/5ML
5 INJECTION INTRAVENOUS ONCE
Status: COMPLETED | OUTPATIENT
Start: 2022-03-21 | End: 2022-03-21

## 2022-03-21 RX ORDER — SODIUM CHLORIDE 0.9 % (FLUSH) 0.9 %
10 SYRINGE (ML) INJECTION AS NEEDED
Status: DISCONTINUED | OUTPATIENT
Start: 2022-03-21 | End: 2022-03-22 | Stop reason: HOSPADM

## 2022-03-21 RX ORDER — SODIUM CHLORIDE 0.9 % (FLUSH) 0.9 %
10 SYRINGE (ML) INJECTION EVERY 12 HOURS SCHEDULED
Status: DISCONTINUED | OUTPATIENT
Start: 2022-03-22 | End: 2022-03-22 | Stop reason: HOSPADM

## 2022-03-21 RX ORDER — ASPIRIN 81 MG/1
81 TABLET, CHEWABLE ORAL DAILY
Status: DISCONTINUED | OUTPATIENT
Start: 2022-03-22 | End: 2022-03-22 | Stop reason: HOSPADM

## 2022-03-21 RX ORDER — ASPIRIN 81 MG/1
324 TABLET, CHEWABLE ORAL ONCE
Status: COMPLETED | OUTPATIENT
Start: 2022-03-21 | End: 2022-03-21

## 2022-03-21 RX ORDER — CHOLECALCIFEROL (VITAMIN D3) 125 MCG
5 CAPSULE ORAL NIGHTLY PRN
Status: DISCONTINUED | OUTPATIENT
Start: 2022-03-21 | End: 2022-03-22 | Stop reason: HOSPADM

## 2022-03-21 RX ORDER — NITROGLYCERIN 0.4 MG/1
0.4 TABLET SUBLINGUAL
Status: DISCONTINUED | OUTPATIENT
Start: 2022-03-21 | End: 2022-03-22 | Stop reason: HOSPADM

## 2022-03-21 RX ADMIN — METHOHEXITAL SODIUM 20 MG: 500 INJECTION, POWDER, LYOPHILIZED, FOR SOLUTION INTRAMUSCULAR; INTRAVENOUS; RECTAL at 16:26

## 2022-03-21 RX ADMIN — ENOXAPARIN SODIUM 110 MG: 120 INJECTION SUBCUTANEOUS at 19:33

## 2022-03-21 RX ADMIN — Medication 10 ML: at 23:31

## 2022-03-21 RX ADMIN — METOPROLOL TARTRATE 25 MG: 25 TABLET, FILM COATED ORAL at 19:33

## 2022-03-21 RX ADMIN — METOPROLOL TARTRATE 5 MG: 5 INJECTION INTRAVENOUS at 16:33

## 2022-03-21 RX ADMIN — ASPIRIN 81 MG 324 MG: 81 TABLET ORAL at 19:33

## 2022-03-21 RX ADMIN — METHOHEXITAL SODIUM 80 MG: 500 INJECTION, POWDER, LYOPHILIZED, FOR SOLUTION INTRAMUSCULAR; INTRAVENOUS; RECTAL at 16:24

## 2022-03-21 NOTE — ED PROVIDER NOTES
Subjective   The patient presents to the emergency department with chest pain/tightness, palpitations, diaphoresis, weakness, and near syncope.  Patient has a history of paroxysmal atrial fibrillation.  Patient is currently wearing a monitor and he hit the button and they notified him that he was in A. fib and come to the ER.  Patient is currently on aspirin.  He reports the symptoms started about 2 hours ago.  They were sudden onset and he can tell exactly when it started.  He was working on a forklift and nearly passed out.  Patient arrives significantly symptomatic with diaphoresis, chest tightness, and generalized weakness.      History provided by:  Patient and relative      Review of Systems   Constitutional: Positive for fatigue.   Respiratory: Positive for chest tightness and shortness of breath.    Cardiovascular: Positive for palpitations.   Neurological: Positive for weakness.       Past Medical History:   Diagnosis Date   • Anxiety    • Atrial fibrillation (HCC) 2007   • Dizziness    • Hyperlipidemia    • Hypertension    • Insomnia    • Mitral valve prolapse 2000   • Palpitations    • Precordial pain    • Sleep apnea        Allergies   Allergen Reactions   • Penicillins Other (See Comments)     Reaction unknown, was advised by family        Past Surgical History:   Procedure Laterality Date   • CARDIAC CATHETERIZATION  2018, 1997    negative findings   • KNEE SURGERY     • SHOULDER SURGERY         Family History   Problem Relation Age of Onset   • Arrhythmia Mother    • Heart disease Father    • Heart attack Father    • Heart attack Maternal Grandfather    • Heart attack Paternal Grandfather        Social History     Socioeconomic History   • Marital status:    Tobacco Use   • Smoking status: Never Smoker   • Smokeless tobacco: Never Used   Substance and Sexual Activity   • Alcohol use: Yes     Comment: social   • Drug use: Never           Objective   Physical Exam  Vitals and nursing note  reviewed.   Constitutional:       General: He is in acute distress.      Appearance: He is obese. He is ill-appearing and diaphoretic.   HENT:      Head: Normocephalic and atraumatic.   Cardiovascular:      Rate and Rhythm: Tachycardia present.      Pulses:           Radial pulses are 1+ on the right side and 1+ on the left side.   Pulmonary:      Effort: Tachypnea present.   Abdominal:      Palpations: Abdomen is soft.      Tenderness: There is no abdominal tenderness.   Musculoskeletal:      Right lower leg: No edema.      Left lower leg: No edema.   Skin:     Comments: Skin is quite diaphoretic and pale.   Neurological:      Mental Status: He is alert and oriented to person, place, and time.         Electrical Cardioversion    Date/Time: 3/21/2022 4:33 PM  Performed by: Elvin Lujan MD  Authorized by: Elvin Lujan MD     Consent:     Consent obtained:  Emergent situation and verbal    Consent given by:  Patient    Risks, benefits, and alternatives were discussed: yes      Risks discussed:  Cutaneous burn, death, induced arrhythmia and pain  Universal protocol:     Procedure explained and questions answered to patient or proxy's satisfaction: yes      Immediately prior to procedure, a time out was called: yes      Patient identity confirmed:  Verbally with patient and arm band  Pre-procedure details:     Cardioversion basis:  Emergent    Rhythm:  Atrial fibrillation    Electrode placement:  Anterior-posterior  Attempt one:     Cardioversion mode:  Synchronous    Waveform:  Biphasic    Shock (Joules):  150    Shock outcome:  Conversion to normal sinus rhythm  Post-procedure details:     Patient status:  Awake    Procedure completion:  Tolerated well, no immediate complications  Procedural Sedation    Date/Time: 3/21/2022 4:34 PM  Performed by: Elvin Lujan MD  Authorized by: Elvin Lujan MD     Consent:     Consent obtained:  Emergent situation    Consent given by:  Patient     Risks, benefits, and alternatives were discussed: yes      Risks discussed:  Allergic reaction, dysrhythmia, inadequate sedation, nausea, vomiting, prolonged hypoxia resulting in organ damage and respiratory compromise necessitating ventilatory assistance and intubation  Universal protocol:     Procedure explained and questions answered to patient or proxy's satisfaction: yes      Immediately prior to procedure, a time out was called: yes      Patient identity confirmed:  Verbally with patient and arm band  Indications:     Procedure performed:  Cardioversion    Procedure necessitating sedation performed by:  Physician performing sedation    Intended level of sedation:  Deep  Pre-sedation assessment:     Time since last food or drink:  4hrs    NPO status caution: unable to specify NPO status      ASA classification: class 2 - patient with mild systemic disease      Mouth opening:  3 or more finger widths    Thyromental distance:  4 finger widths    Mallampati score:  I - soft palate, uvula, fauces, pillars visible    Neck mobility: normal      Pre-sedation assessments completed and reviewed: airway patency, anesthesia/sedation history, cardiovascular function, hydration status, mental status, nausea/vomiting, pain level and respiratory function      History of difficult intubation: no    Immediate pre-procedure details:     Verified: bag valve mask available, emergency equipment available, IV patency confirmed, oxygen available and suction available    Procedure details (see MAR for exact dosages):     Sedation start time:  3/21/2022 4:20 PM    Preoxygenation:  Nasal cannula    Sedation:  Methohexital    Intra-procedure monitoring:  Blood pressure monitoring, continuous pulse oximetry, cardiac monitor, frequent LOC assessments and frequent vital sign checks    Intra-procedure events comment:  Borderline hypoxemia(88%)    Intra-procedure management:  Airway repositioning and supplemental oxygen    Sedation end time:   3/21/2022 4:30 PM    Total sedation time (minutes):  10  Post-procedure details:     Post-sedation assessment completed:  3/21/2022 4:36 PM    Attendance: Constant attendance by certified staff until patient recovered      Recovery: Patient returned to pre-procedure baseline      Procedure completion:  Tolerated well, no immediate complications  Critical Care  Performed by: Elvin Lujan MD  Authorized by: Elvin Lujan MD     Critical care provider statement:     Critical care time (minutes):  30    Critical care was necessary to treat or prevent imminent or life-threatening deterioration of the following conditions:  Cardiac failure and circulatory failure    Critical care was time spent personally by me on the following activities:  Discussions with primary provider, discussions with consultants, evaluation of patient's response to treatment, examination of patient, interpretation of cardiac output measurements, obtaining history from patient or surrogate, ordering and performing treatments and interventions, ordering and review of laboratory studies, ordering and review of radiographic studies, re-evaluation of patient's condition and pulse oximetry    Care discussed with: admitting provider                 ED Course  ED Course as of 03/21/22 2235   Mon Mar 21, 2022   1612 Heart Rate(!): 180 [RS]   1646 Heart Rate: 86 [RS]   1646 Successful emergent cardioversion secondary to significantly symptomatic and unstable rapid atrial fibrillation.  See documentation of procedures for details. [RS]   1714 Troponin T(!!): 0.053 [RS]   1714 proBNP: 132.8 [RS]   1735 XR Chest 1 View  Personally reviewed the 1 view chest demonstrating no focal infiltrate or emergent finding.  See report from radiology for details. [RS]   1742 Cardiology paged. [RS]   1832 Troponin T(!!): 0.121 [RS]   1832 Cardiology paged. [RS]   1837 Discussed the case in detail with Dr. Najera with cardiology.  We reviewed the patient's  history, presenting symptoms, findings, labs, EKGs, and findings.  He advised that the patient does not need to be admitted.  He recommends discharge and he will facilitate expedited work-up for further management.  He does not recommend anticoagulation at this time secondary to a low [id number] score.  He does want us to increase the metoprolol to 100 mg XL daily.  I have messaged Dr. Najera the details for the expedited follow-up arrangements. [RS]   1903 I talked with the patient and family, the patient does report continued to have some occasional chest tightness and lightheadedness without any arrhythmia changes.  Is also reports and family agree that they are not currently comfortable with him going home with the presenting symptoms.  I feel this is very reasonable and am supportive of admission for further evaluation and management.  Hospitalist paged. [RS]   1915 Case discussed with Dr. Reilly who is agreeable with plan for admission. [RS]      ED Course User Index  [RS] Elvin Lujan MD                                               [id number] Score (for atrial fibrillation stroke risk) reviewed and/or performed as part of the patient evaluation and treatment planning process.  The result associated with this review/performance is: 1       MDM  Number of Diagnoses or Management Options  Chest pain, unspecified type  Elevated troponin  History of hyperlipidemia  History of hypertension  Near syncope  Paroxysmal atrial fibrillation with rapid ventricular response (HCC)  Diagnosis management comments: Recent Results (from the past 24 hour(s))  -ECG 12 Lead:   Collection Time: 03/21/22  4:21 PM       Result                      Value             Ref Range           QT Interval                 238               ms                  QTC Interval                431               ms             -ECG 12 Lead:   Collection Time: 03/21/22  4:29 PM       Result                      Value             Ref Range            QT Interval                 348               ms                  QTC Interval                462               ms             -Troponin:   Collection Time: 03/21/22  4:35 PM  Specimen: Blood       Result                      Value             Ref Range           Troponin T                  0.053 (C)         0.000 - 0.03*  -Comprehensive Metabolic Panel:   Collection Time: 03/21/22  4:35 PM  Specimen: Blood       Result                      Value             Ref Range           Glucose                     109 (H)           65 - 99 mg/dL       BUN                         13                6 - 20 mg/dL        Creatinine                  1.00              0.76 - 1.27 *       Sodium                      136               136 - 145 mm*       Potassium                   4.1               3.5 - 5.2 mm*       Chloride                    102               98 - 107 mmo*       CO2                         22.0              22.0 - 29.0 *       Calcium                     9.5               8.6 - 10.5 m*       Total Protein               7.3               6.0 - 8.5 g/*       Albumin                     4.80              3.50 - 5.20 *       ALT (SGPT)                  31                1 - 41 U/L          AST (SGOT)                  24                1 - 40 U/L          Alkaline Phosphatase        81                39 - 117 U/L        Total Bilirubin             0.5               0.0 - 1.2 mg*       Globulin                    2.5               gm/dL               A/G Ratio                   1.9               g/dL                BUN/Creatinine Ratio        13.0              7.0 - 25.0          Anion Gap                   12.0              5.0 - 15.0 m*       eGFR                        87.8              >60.0 mL/min*  -Lipase:   Collection Time: 03/21/22  4:35 PM  Specimen: Blood       Result                      Value             Ref Range           Lipase                      27                13 - 60 U/L    -BNP:    Collection Time: 03/21/22  4:35 PM  Specimen: Blood       Result                      Value             Ref Range           proBNP                      132.8             0.0 - 900.0 *  -Green Top (Gel):   Collection Time: 03/21/22  4:35 PM       Result                      Value             Ref Range           Extra Tube                                                    Hold for add-ons.  -Gold Top - SST:   Collection Time: 03/21/22  4:35 PM       Result                      Value             Ref Range           Extra Tube                                                    Hold for add-ons.  -Gray Top:   Collection Time: 03/21/22  4:35 PM       Result                      Value             Ref Range           Extra Tube                                                    Hold for add-ons.  -Light Blue Top:   Collection Time: 03/21/22  4:35 PM       Result                      Value             Ref Range           Extra Tube                                                    hold for add-on  -Troponin:   Collection Time: 03/21/22  5:51 PM  Specimen: Blood       Result                      Value             Ref Range           Troponin T                  0.121 (C)         0.000 - 0.03*  -Lavender Top:   Collection Time: 03/21/22  5:51 PM       Result                      Value             Ref Range           Extra Tube                                                    hold for add-on  -CBC Auto Differential:   Collection Time: 03/21/22  5:51 PM  Specimen: Blood       Result                      Value             Ref Range           WBC                         9.49              3.40 - 10.80*       RBC                         4.92              4.14 - 5.80 *       Hemoglobin                  15.0              13.0 - 17.7 *       Hematocrit                  43.1              37.5 - 51.0 %       MCV                         87.6              79.0 - 97.0 *       MCH                         30.5              26.6 - 33.0  *       MCHC                        34.8              31.5 - 35.7 *       RDW                         13.2              12.3 - 15.4 %       RDW-SD                      41.8              37.0 - 54.0 *       MPV                         9.4               6.0 - 12.0 fL       Platelets                   166               140 - 450 10*       Neutrophil %                77.9 (H)          42.7 - 76.0 %       Lymphocyte %                15.3 (L)          19.6 - 45.3 %       Monocyte %                  5.1               5.0 - 12.0 %        Eosinophil %                0.9               0.3 - 6.2 %         Basophil %                  0.5               0.0 - 1.5 %         Immature Grans %            0.3               0.0 - 0.5 %         Neutrophils, Absolute       7.39 (H)          1.70 - 7.00 *       Lymphocytes, Absolute       1.45              0.70 - 3.10 *       Monocytes, Absolute         0.48              0.10 - 0.90 *       Eosinophils, Absolute       0.09              0.00 - 0.40 *       Basophils, Absolute         0.05              0.00 - 0.20 *       Immature Grans, Absolu*     0.03              0.00 - 0.05 *       nRBC                        0.0               0.0 - 0.2 /1*  -COVID-19 and FLU A/B PCR - Swab, Nasopharynx:   Collection Time: 03/21/22  8:47 PM  Specimen: Nasopharynx; Swab       Result                      Value             Ref Range           COVID19                     Not Detected      Not Detected*       Influenza A PCR             Not Detected      Not Detected        Influenza B PCR             Not Detected      Not Detected   Note: In addition to lab results from this visit, the labs listed above may include labs taken at another facility or during a different encounter within the last 24 hours. Please correlate lab times with ED admission and discharge times for further clarification of the services performed during this visit.    XR Chest 1 View   Final Result    No acute cardiopulmonary process  identified.         This report was finalized on 3/21/2022 5:24 PM by Daniel Patel MD.          -----------------------------------------------------            03/21/22 03/21/22 03/21/22 03/21/22 2000 2020 2030 2200     -----------------------------------------------------   BP:       128/86    144/89    139/89    129/93     BP Location:                                           Patient Position:                                           Pulse:                72        72        62       Resp:                                              Temp:                                              TempSrc:                                           SpO2:      100%      99%       99%       96%       Weight:                                            Height:                                           -----------------------------------------------------  Medications  sodium chloride 0.9 % flush 10 mL (has no administration in time range)  methohexital (BREVITAL) injection  - ADS Override Pull (has no administration in time range)  aspirin chewable tablet 324 mg (324 mg Oral Given 3/21/22 1933)  methohexital (BREVITAL) injection  - ADS Override Pull (100 mg  Given by Other 3/21/22 1636)  methohexital (BREVITAL SODIUM) injection (20 mg Intravenous Given 3/21/22 1626)  metoprolol tartrate (LOPRESSOR) injection 5 mg (5 mg Intravenous Given 3/21/22 1633)  metoprolol tartrate (LOPRESSOR) tablet 25 mg (25 mg Oral Given 3/21/22 1933)   enoxaparin (LOVENOX) syringe 110 mg (110 mg Subcutaneous Given 3/21/22 1933)  ECG/EMG Results (last 24 hours)     Procedure Component Value Units Date/Time    ECG 12 Lead ([id number]) Collected: 03/21/22 1629     Updated: 03/21/22 1703     QT Interval 348 ms      QTC Interval 462 ms     Narrative:      Test Reason : chest pain  Blood Pressure :   */*   mmHG  Vent. Rate : 106 BPM     Atrial Rate  : 106 BPM     P-R Int : 164 ms          QRS Dur :  90 ms      QT Int : 348 ms       P-R-T Axes :  33  19  24 degrees     QTc Int : 462 ms    Sinus tachycardia  Nonspecific ST abnormality  Abnormal ECG  When compared with ECG of 21-MAR-2022 16:21, (Unconfirmed)  Vent. rate has decreased BY  92 BPM  T wave inversion less evident in Inferior leads  T wave inversion no longer evident in Lateral leads  Confirmed by JAYLENE SALAZAR MD ([id number]) on 3/21/2022 5:02:59 PM    Referred By: EDMD           Confirmed By: JAYLENE SALAZAR MD    ECG 12 Lead ([id number]) Collected: 03/21/22 1621     Updated: 03/21/22 1703     QT Interval 238 ms      QTC Interval 431 ms     Narrative:      Test Reason : chest pain  Blood Pressure :   */*   mmHG  Vent. Rate : 198 BPM     Atrial Rate : 208 BPM     P-R Int :   * ms          QRS Dur :  92 ms      QT Int : 238 ms       P-R-T Axes :   *  27 -58 degrees     QTc Int : 431 ms    SVT versus rapid Afib  Marked ST abnormality, possible inferolateral subendocardial injury  Abnormal ECG  No previous ECGs available  Confirmed by JAYLENE SALAZAR MD ([id number]) on 3/21/2022 5:03:18 PM    Referred By: EDMD           Confirmed By: JAYLENE SALAZAR MD      ECG 12 Lead   Final Result    Test Reason : chest pain    Blood Pressure :   */*   mmHG    Vent. Rate : 106 BPM     Atrial Rate : 106 BPM       P-R Int : 164 ms          QRS Dur :  90 ms        QT Int : 348 ms       P-R-T Axes :  33  19  24 degrees       QTc Int : 462 ms        Sinus tachycardia    Nonspecific ST abnormality    Abnormal ECG    When compared with ECG of 21-MAR-2022 16:21, (Unconfirmed)    Vent. rate has decreased BY  92 BPM    T wave inversion less evident in Inferior leads    T wave inversion no longer evident in Lateral leads    Confirmed by JAYLENE SALAZAR MD ([id number]) on 3/21/2022 5:02:59 PM        Referred By: EDMD           Confirmed By: JAYLENE SALAZAR MD     ECG 12 Lead   Final Result    Test Reason : chest pain    Blood Pressure :   */*   mmHG    Vent.  Rate : 198 BPM     Atrial Rate : 208 BPM       P-R Int :   * ms          QRS Dur :  92 ms        QT Int : 238 ms       P-R-T Axes :   *  27 -58 degrees       QTc Int : 431 ms        SVT versus rapid Afib    Marked ST abnormality, possible inferolateral subendocardial injury    Abnormal ECG    No previous ECGs available    Confirmed by ELVIN LUJAN MD ([id number]) on 3/21/2022 5:03:18 PM        Referred By: EDMD           Confirmed By: ELVIN LUJAN MD            Amount and/or Complexity of Data Reviewed  Clinical lab tests: reviewed  Tests in the radiology section of CPT®: reviewed  Decide to obtain previous medical records or to obtain history from someone other than the patient: yes  Obtain history from someone other than the patient: yes  Discuss the patient with other providers: yes  Independent visualization of images, tracings, or specimens: yes    Critical Care  Total time providing critical care: 30-74 minutes      Final diagnoses:   Paroxysmal atrial fibrillation with rapid ventricular response (HCC)   Near syncope   Chest pain, unspecified type   Elevated troponin   History of hypertension   History of hyperlipidemia       ED Disposition  ED Disposition     ED Disposition   Decision to Admit    Condition   --    Comment   Level of Care: Telemetry [5]   Diagnosis: Paroxysmal atrial fibrillation with rapid ventricular response (HCC) [[id number]]   Admitting Physician: WINSTON MÉNDEZ III [[id number]]   Attending Physician: WINSTON MÉNDEZ III [[id number]]   Isolate for COVID?: No [0]   Bed Request Comments: inpt tele   Certification: I Certify That Inpatient Hospital Services Are Medically Necessary For Greater Than 2 Midnights               No follow-up provider specified.       Medication List      No changes were made to your prescriptions during this visit.          Elvin Lujan MD  03/21/22 3971

## 2022-03-21 NOTE — H&P
[facility] Medicine Services  HISTORY AND PHYSICAL    Patient Name: Asim Priest  : 1965  MRN: [id number]  Primary Care Physician: Breezy Horn NP-C  Date of admission: 3/21/2022    Subjective   Subjective     Chief Complaint:  Palpitations     HPI:  Asim Priest is a 56 y.o. male with history of A. fib, hypertension, hyperlipidemia, Covid infection 2022, anxiety, presents to the ED with complaints of palpitations.  Patient has been wearing a heart monitor for the past week.  He was told if he became symptomatic to push the button on his monitor, and to go to the ED for evaluation.  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  He has been having similar symptoms recently but never this severe.  He denies chest pain, edema, nausea, vomiting, abdominal pain, syncope, or any other complaints at this time.  He has had a lingering cough since having Covid in January.  Patient is supposed to be scheduled for a stress test in the near future with Dr. Coley.  Upon arrival to the ED he was found to have a heart rate of 198 and EKG showed afib.  Patient underwent a successful cardioversion in the ED.  Pertinent labs include troponin of 0.053 and 0.121.  He was given aspirin, Lopressor, and Lovenox 1 mg/kg in the ED.  Patient is being admitted to the Hospitalist for further evaluation and management.    COVID Details:        Symptoms: [] NONE [] Fever [x]  Cough [x] Shortness of breath [] Change in taste or smell  The patient qualifies to receive the vaccine, but they have not yet received it.    Review of Systems   Constitutional: Positive for diaphoresis. Negative for appetite change, chills, fatigue and fever.   Eyes: Negative.    Respiratory: Positive for cough and shortness of breath. Negative for wheezing.    Cardiovascular: Positive for palpitations. Negative  for chest pain and leg swelling.   Gastrointestinal: Negative.    Endocrine: Negative.    Genitourinary: Negative.    Musculoskeletal: Negative.    Skin: Negative.    Allergic/Immunologic: Negative.    Neurological: Positive for dizziness, light-headedness and headaches.   Hematological: Negative.    Psychiatric/Behavioral: Negative.         All other systems reviewed and are negative.     Personal History     Past Medical History:   Diagnosis Date   • Anxiety    • Atrial fibrillation (HCC) 2007   • Dizziness    • Hyperlipidemia    • Hypertension    • Insomnia    • Mitral valve prolapse 2000   • Palpitations    • Precordial pain    • Sleep apnea        Past Surgical History:   Procedure Laterality Date   • CARDIAC CATHETERIZATION  2018, 1997    negative findings   • KNEE SURGERY     • SHOULDER SURGERY         Family History:  family history includes Arrhythmia in his mother; Heart attack in his father, maternal grandfather, and paternal grandfather; Heart disease in his father. Otherwise pertinent FHx was reviewed and unremarkable.     Social History:  reports that he has never smoked. He has never used smokeless tobacco. He reports current alcohol use. He reports that he does not use drugs.  Social History     Social History Narrative   • Not on file       Medications:  Zolpidem Tartrate, aspirin, metoprolol succinate XL, nitroglycerin, and sertraline    Allergies   Allergen Reactions   • Penicillins Other (See Comments)     Reaction unknown, was advised by family        Objective   Objective     Vital Signs:   Temp:  [98 °F (36.7 °C)] 98 °F (36.7 °C)  Heart Rate:  [] 75  Resp:  [18] 18  BP: (111-162)/() 128/89    Physical Exam   Constitutional: Awake, alert, sitting up in bed, family at bedside  Eyes: PERRLA, sclerae anicteric, no conjunctival injection  HENT: NCAT, mucous membranes moist  Neck: Supple, no thyromegaly, no lymphadenopathy, trachea midline  Respiratory: Clear to auscultation bilaterally,  nonlabored respirations   Cardiovascular: RRR, no murmurs, rubs, or gallops, palpable pedal pulses bilaterally  Gastrointestinal: Positive bowel sounds, soft, nontender, nondistended  Musculoskeletal: No bilateral ankle edema, no clubbing or cyanosis to extremities  Psychiatric: Appropriate affect, cooperative  Neurologic: Oriented x 3, strength symmetric in all extremities, Cranial Nerves grossly intact to confrontation, speech clear  Skin: No rashes       Result Review:  I have personally reviewed the results from the time of this admission to 03/21/22 7:18 PM EDT and agree with these findings:  [x]  Laboratory  []  Microbiology  [x]  Radiology  [x]  EKG/Telemetry   []  Cardiology/Vascular   []  Pathology  [x]  Old records  []  Other:  Most notable findings include:       LAB RESULTS:      Lab 03/21/22  1751   WBC 9.49   HEMOGLOBIN 15.0   HEMATOCRIT 43.1   PLATELETS 166   NEUTROS ABS 7.39*   IMMATURE GRANS (ABS) 0.03   LYMPHS ABS 1.45   MONOS ABS 0.48   EOS ABS 0.09   MCV 87.6         Lab 03/21/22  1635   SODIUM 136   POTASSIUM 4.1   CHLORIDE 102   CO2 22.0   ANION GAP 12.0   BUN 13   CREATININE 1.00   EGFR 87.8   GLUCOSE 109*   CALCIUM 9.5         Lab 03/21/22  1635   TOTAL PROTEIN 7.3   ALBUMIN 4.80   GLOBULIN 2.5   ALT (SGPT) 31   AST (SGOT) 24   BILIRUBIN 0.5   ALK PHOS 81   LIPASE 27         Lab 03/21/22  1751 03/21/22  1635   PROBNP  --  132.8   TROPONIN T 0.121* 0.053*                   Microbiology Results (last 10 days)     ** No results found for the last 240 hours. **          XR Chest 1 View    Result Date: 3/21/2022  XR CHEST 1 VW-  Date of Exam: 3/21/2022 5:11 PM  Indication: Chest Pain triage protocol.  Comparison Exams: None available.  Technique: Single AP chest radiograph  FINDINGS: The lungs are clear. The heart and mediastinal contours appear normal. The pulmonary vasculature appears normal. The osseous structures appear intact.      Impression: No acute cardiopulmonary process identified.   This report was finalized on 3/21/2022 5:24 PM by Daniel Patel MD.        Results for orders placed during the hospital encounter of 03/03/20    Adult Stress Echo W/ Cont or Stress Agent if Necessary Per Protocol    Interpretation Summary  · Resting Echocardiogram Findings:  · Normal left ventricular cavity size and wall thickness noted. All left ventricular wall segments contract normally.  · Estimated EF appears to be in the range of 61 - 65%.  · The valve appears trileaflet. The aortic valve is abnormal in structure. The valve exhibits sclerosis. No aortic valve regurgitation is present. No aortic valve stenosis is present  · The mitral valve is normal in structure. No mitral valve regurgitation is present. No significant mitral valve stenosis is present.  · Tricuspid valve not well visualized. The tricuspid valve is grossly normal. No tricuspid valve regurgitation is present.  · There is no evidence of pericardial effusion.  · Stress Procedure:  · A stress test was performed following the Omi protocol.  · Exercise duration (min) 7 min Exercise duration (sec) 30 sec Estimated workload 10.1 METS  · Baseline Vitals Baseline HR 83 bpm Baseline /98 mmHg Peak Stress Vitals Peak  bpm Peak /92 mmHg Recovery Vitals Recovery HR 82 bpm Recovery /102 mmHg Exercise Data Target HR (85%) 140 bpm Max. Pred. HR (100%) 165 bpm Percent Max Pred HR 87.27 %  · A horizontal ST segment depression of 1 mm in the inferolateral leads was noted during stress (II, III, aVF, V5, V6, V4 and V3), and returning to baseline after less than 1 minute of recovery.  · There were no arrhythmias during stress.  · Abnormal with interpretable ST segment stress ECG interpretation.  · Stress Echo Findings:  · Segment augmentation had a normal response to stress  · Cavity size behaved normally in response to stress. Left ventricular function is normal. Septal wall motion is normal  · Normal stress echo with no significant  echocardiographic evidence for myocardial ischemia.      Assessment/Plan   Assessment & Plan       SVT (supraventricular tachycardia) (HCC)    Elevated troponin    A-fib (HCC)    HTN (hypertension)    HLD (hyperlipidemia)    RACHEL (obstructive sleep apnea)    Hypoxia    Paroxysmal atrial fibrillation with rapid ventricular response (HCC)    Asim Priest is a 56 y.o. male with history of A. fib, hypertension, hyperlipidemia, Covid infection 1/2022, anxiety, presents to the ED with complaints of palpitations.       Assessment and Plan:    A. fib with RVR versus SVT  Elevated troponin  Hypoxia  -- troponin of 0.053 and 0.121   --Given aspirin and Lovenox 1 mg/kg in the ED   --Patient has a history of an isolated episode of A. fib in 2007  --has been wearing a heart monitor for the past week  --Follows with Dr. Coley--will consult in the am  --Continue aspirin and metoprolol  --continuous pulse ox with supplemental oxygen as needed  --npo after midnight  --trend troponin  --EKG in the am  --echo in the am  --am labs    HTN  HLD  --metoprolol  --am labs        DVT prophylaxis: Was given Lovenox 1 mg/kg in the ED    CODE STATUS:    Level Of Support Discussed With: Patient  Code Status (Patient has no pulse and is not breathing): CPR (Attempt to Resuscitate)  Medical Interventions (Patient has pulse or is breathing): Full Support      This note has been completed as part of a split-shared workflow.   Signature: Electronically signed by INDY James, 03/21/22, 8:30 PM EDT.       Attending   Admission Attestation       I have seen and examined the patient, performing an independent face-to-face diagnostic evaluation with plan of care reviewed and developed with the advanced practice clinician (APC).      Brief Summary Statement:   Asim Priest is a 56 y.o. male who states he had palpitations, shortness of breath, dizziness, and diaphoresis that started in the late afternoon while he was at  work. He has actually been wearing a cardiac monitor for the last week as prescribed by a cardiologist, and when he had the above symptoms today he activated the device to being recording and then came to the ED. He denies any chest pain. On Ed arrival he had a rate around 200 and began to c/o chest tightness, so he was elecrtically cardioverted in the ED. He returned to normal sinus rhythm and his symptoms resolved. I reviewed the telemetry strip from ED arrival and his EKG which showed SVT/a-fib. Subsequent EKG showed sinus tach and he eventually moved to a normal rate and rhythm by the time I visited him in his ED room with his family.    Remainder of detailed HPI is as noted by APC and has been reviewed and/or edited by me for completeness.    Attending Physical Exam:  Constitutional: Awake, alert  Eyes: PERRLA, sclerae anicteric, no conjunctival injection  HENT: NCAT, mucous membranes moist  Neck: Supple, no thyromegaly, no lymphadenopathy, trachea midline  Respiratory: Clear to auscultation bilaterally, nonlabored respirations   Cardiovascular: RRR, no murmurs, rubs, or gallops, palpable pedal pulses bilaterally  Gastrointestinal: Positive bowel sounds, soft, nontender, nondistended  Musculoskeletal: No bilateral ankle edema, no clubbing or cyanosis to extremities  Psychiatric: Appropriate affect, cooperative  Neurologic: Oriented x 3, strength symmetric in all extremities, Cranial Nerves grossly intact to confrontation, speech clear  Skin: No rashes, normal turgor    Brief Assessment/Plan :  See detailed assessment and plan developed with APC which I have reviewed and/or edited for completeness.    Patient also confirms daily beer intake and patient and family state possible need for CIWA protocol, which I have added to the orders.    Admission Status: I believe that this patient meets inpatient criteria due to SVT which required cardioversion, as well as need for cardiology consult.      Hever Greenberg  Melba,III, DO  03/21/22

## 2022-03-22 ENCOUNTER — APPOINTMENT (OUTPATIENT)
Dept: CARDIOLOGY | Facility: HOSPITAL | Age: 57
End: 2022-03-22

## 2022-03-22 VITALS
SYSTOLIC BLOOD PRESSURE: 149 MMHG | WEIGHT: 242 LBS | RESPIRATION RATE: 18 BRPM | HEIGHT: 72 IN | OXYGEN SATURATION: 96 % | TEMPERATURE: 97.7 F | BODY MASS INDEX: 32.78 KG/M2 | HEART RATE: 63 BPM | DIASTOLIC BLOOD PRESSURE: 92 MMHG

## 2022-03-22 LAB
ANION GAP SERPL CALCULATED.3IONS-SCNC: 10 MMOL/L (ref 5–15)
BH CV ECHO MEAS - AO MAX PG (FULL): 4.1 MMHG
BH CV ECHO MEAS - AO MAX PG: 8 MMHG
BH CV ECHO MEAS - AO MEAN PG (FULL): 2.1 MMHG
BH CV ECHO MEAS - AO MEAN PG: 4.5 MMHG
BH CV ECHO MEAS - AO ROOT AREA (BSA CORRECTED): 1.3
BH CV ECHO MEAS - AO ROOT AREA: 7.1 CM^2
BH CV ECHO MEAS - AO ROOT DIAM: 3 CM
BH CV ECHO MEAS - AO V2 MAX: 139.4 CM/SEC
BH CV ECHO MEAS - AO V2 MEAN: 99.2 CM/SEC
BH CV ECHO MEAS - AO V2 VTI: 31.4 CM
BH CV ECHO MEAS - AVA(I,A): 2.4 CM^2
BH CV ECHO MEAS - AVA(I,D): 2.4 CM^2
BH CV ECHO MEAS - AVA(V,A): 2.2 CM^2
BH CV ECHO MEAS - AVA(V,D): 2.2 CM^2
BH CV ECHO MEAS - BSA(HAYCOCK): 2.4 M^2
BH CV ECHO MEAS - BSA: 2.3 M^2
BH CV ECHO MEAS - BZI_BMI: 32.8 KILOGRAMS/M^2
BH CV ECHO MEAS - BZI_METRIC_HEIGHT: 182.9 CM
BH CV ECHO MEAS - BZI_METRIC_WEIGHT: 109.8 KG
BH CV ECHO MEAS - EDV(CUBED): 93.7 ML
BH CV ECHO MEAS - EDV(MOD-SP2): 128 ML
BH CV ECHO MEAS - EDV(MOD-SP4): 142 ML
BH CV ECHO MEAS - EDV(TEICH): 94.5 ML
BH CV ECHO MEAS - EF(CUBED): 70.5 %
BH CV ECHO MEAS - EF(MOD-BP): 52.6 %
BH CV ECHO MEAS - EF(MOD-SP2): 47.5 %
BH CV ECHO MEAS - EF(MOD-SP4): 57.8 %
BH CV ECHO MEAS - EF(TEICH): 62.3 %
BH CV ECHO MEAS - ESV(CUBED): 27.6 ML
BH CV ECHO MEAS - ESV(MOD-SP2): 67.2 ML
BH CV ECHO MEAS - ESV(MOD-SP4): 59.9 ML
BH CV ECHO MEAS - ESV(TEICH): 35.7 ML
BH CV ECHO MEAS - FS: 33.4 %
BH CV ECHO MEAS - IVS/LVPW: 1
BH CV ECHO MEAS - IVSD: 1.3 CM
BH CV ECHO MEAS - LA DIMENSION: 5.3 CM
BH CV ECHO MEAS - LA/AO: 1.8
BH CV ECHO MEAS - LAD MAJOR: 5.7 CM
BH CV ECHO MEAS - LAT PEAK E' VEL: 8.7 CM/SEC
BH CV ECHO MEAS - LATERAL E/E' RATIO: 7
BH CV ECHO MEAS - LV DIASTOLIC VOL/BSA (35-75): 61.5 ML/M^2
BH CV ECHO MEAS - LV MASS(C)D: 216.9 GRAMS
BH CV ECHO MEAS - LV MASS(C)DI: 93.9 GRAMS/M^2
BH CV ECHO MEAS - LV MAX PG: 3.9 MMHG
BH CV ECHO MEAS - LV MEAN PG: 2.4 MMHG
BH CV ECHO MEAS - LV SYSTOLIC VOL/BSA (12-30): 25.9 ML/M^2
BH CV ECHO MEAS - LV V1 MAX: 99 CM/SEC
BH CV ECHO MEAS - LV V1 MEAN: 74.4 CM/SEC
BH CV ECHO MEAS - LV V1 VTI: 24.4 CM
BH CV ECHO MEAS - LVIDD: 4.5 CM
BH CV ECHO MEAS - LVIDS: 3 CM
BH CV ECHO MEAS - LVLD AP2: 9.6 CM
BH CV ECHO MEAS - LVLD AP4: 9.6 CM
BH CV ECHO MEAS - LVLS AP2: 8.8 CM
BH CV ECHO MEAS - LVLS AP4: 8.6 CM
BH CV ECHO MEAS - LVOT AREA (M): 3.1 CM^2
BH CV ECHO MEAS - LVOT AREA: 3.2 CM^2
BH CV ECHO MEAS - LVOT DIAM: 2 CM
BH CV ECHO MEAS - LVPWD: 1.2 CM
BH CV ECHO MEAS - MED PEAK E' VEL: 8.8 CM/SEC
BH CV ECHO MEAS - MEDIAL E/E' RATIO: 6.9
BH CV ECHO MEAS - MV A MAX VEL: 74.7 CM/SEC
BH CV ECHO MEAS - MV DEC TIME: 0.24 SEC
BH CV ECHO MEAS - MV E MAX VEL: 60.9 CM/SEC
BH CV ECHO MEAS - MV E/A: 0.82
BH CV ECHO MEAS - MV MAX PG: 5.2 MMHG
BH CV ECHO MEAS - MV MEAN PG: 1.2 MMHG
BH CV ECHO MEAS - MV V2 MAX: 113.9 CM/SEC
BH CV ECHO MEAS - MV V2 MEAN: 47.4 CM/SEC
BH CV ECHO MEAS - MV V2 VTI: 28.6 CM
BH CV ECHO MEAS - MVA(VTI): 2.7 CM^2
BH CV ECHO MEAS - PA ACC TIME: 0.14 SEC
BH CV ECHO MEAS - PA MAX PG: 4.1 MMHG
BH CV ECHO MEAS - PA PR(ACCEL): 17.2 MMHG
BH CV ECHO MEAS - PA V2 MAX: 100.8 CM/SEC
BH CV ECHO MEAS - RAP SYSTOLE: 3 MMHG
BH CV ECHO MEAS - RVSP: 20 MMHG
BH CV ECHO MEAS - SI(AO): 96.9 ML/M^2
BH CV ECHO MEAS - SI(CUBED): 28.6 ML/M^2
BH CV ECHO MEAS - SI(LVOT): 33.2 ML/M^2
BH CV ECHO MEAS - SI(MOD-SP2): 26.3 ML/M^2
BH CV ECHO MEAS - SI(MOD-SP4): 35.5 ML/M^2
BH CV ECHO MEAS - SI(TEICH): 25.5 ML/M^2
BH CV ECHO MEAS - SV(AO): 224 ML
BH CV ECHO MEAS - SV(CUBED): 66.1 ML
BH CV ECHO MEAS - SV(LVOT): 76.8 ML
BH CV ECHO MEAS - SV(MOD-SP2): 60.8 ML
BH CV ECHO MEAS - SV(MOD-SP4): 82.1 ML
BH CV ECHO MEAS - SV(TEICH): 58.8 ML
BH CV ECHO MEAS - TAPSE (>1.6): 2.3 CM
BH CV ECHO MEAS - TR MAX PG: 17 MMHG
BH CV ECHO MEAS - TR MAX VEL: 207.5 CM/SEC
BH CV ECHO MEASUREMENTS AVERAGE E/E' RATIO: 6.96
BH CV REST NUCLEAR ISOTOPE DOSE: 32.2 MCI
BH CV STRESS BP STAGE 1: NORMAL
BH CV STRESS BP STAGE 2: NORMAL
BH CV STRESS BP STAGE 4: NORMAL
BH CV STRESS COMMENTS STAGE 1: NORMAL
BH CV STRESS DOSE REGADENOSON STAGE 1: 0.4
BH CV STRESS DURATION MIN STAGE 1: 1
BH CV STRESS DURATION MIN STAGE 2: 1
BH CV STRESS DURATION MIN STAGE 3: 1
BH CV STRESS DURATION MIN STAGE 4: 1
BH CV STRESS HR STAGE 1: 66
BH CV STRESS HR STAGE 2: 83
BH CV STRESS HR STAGE 3: 71
BH CV STRESS HR STAGE 4: 69
BH CV STRESS NUCLEAR ISOTOPE DOSE: 32.6 MCI
BH CV STRESS O2 STAGE 1: 99
BH CV STRESS O2 STAGE 2: 99
BH CV STRESS O2 STAGE 3: 100
BH CV STRESS O2 STAGE 4: 98
BH CV STRESS PROTOCOL 1: NORMAL
BH CV STRESS RECOVERY BP: NORMAL MMHG
BH CV STRESS RECOVERY HR: 62 BPM
BH CV STRESS RECOVERY O2: 95 %
BH CV STRESS STAGE 1: 1
BH CV STRESS STAGE 2: 2
BH CV STRESS STAGE 3: 3
BH CV STRESS STAGE 4: 4
BH CV XLRA - RV BASE: 3.5 CM
BH CV XLRA - RV LENGTH: 7.3 CM
BH CV XLRA - RV MID: 2.5 CM
BH CV XLRA - TDI S': 11.9 CM/SEC
BUN SERPL-MCNC: 11 MG/DL (ref 6–20)
BUN/CREAT SERPL: 11.3 (ref 7–25)
CALCIUM SPEC-SCNC: 8.6 MG/DL (ref 8.6–10.5)
CHLORIDE SERPL-SCNC: 108 MMOL/L (ref 98–107)
CHOLEST SERPL-MCNC: 166 MG/DL (ref 0–200)
CO2 SERPL-SCNC: 22 MMOL/L (ref 22–29)
CREAT SERPL-MCNC: 0.97 MG/DL (ref 0.76–1.27)
DEPRECATED RDW RBC AUTO: 42.6 FL (ref 37–54)
EGFRCR SERPLBLD CKD-EPI 2021: 91.1 ML/MIN/1.73
ERYTHROCYTE [DISTWIDTH] IN BLOOD BY AUTOMATED COUNT: 13.2 % (ref 12.3–15.4)
GLUCOSE SERPL-MCNC: 89 MG/DL (ref 65–99)
HCT VFR BLD AUTO: 42 % (ref 37.5–51)
HDLC SERPL-MCNC: 32 MG/DL (ref 40–60)
HGB BLD-MCNC: 14.4 G/DL (ref 13–17.7)
INR PPP: 1.13 (ref 0.85–1.16)
LDLC SERPL CALC-MCNC: 83 MG/DL (ref 0–100)
LDLC/HDLC SERPL: 2.25 {RATIO}
LEFT ATRIUM VOLUME INDEX: 33.7 ML/M^2
LEFT ATRIUM VOLUME: 77.8 ML
LV EF 2D ECHO EST: 60 %
MAGNESIUM SERPL-MCNC: 2.1 MG/DL (ref 1.6–2.6)
MAXIMAL PREDICTED HEART RATE: 163 BPM
MAXIMAL PREDICTED HEART RATE: 163 BPM
MCH RBC QN AUTO: 30.3 PG (ref 26.6–33)
MCHC RBC AUTO-ENTMCNC: 34.3 G/DL (ref 31.5–35.7)
MCV RBC AUTO: 88.2 FL (ref 79–97)
PERCENT MAX PREDICTED HR: 50.92 %
PLATELET # BLD AUTO: 168 10*3/MM3 (ref 140–450)
PMV BLD AUTO: 9.5 FL (ref 6–12)
POTASSIUM SERPL-SCNC: 3.9 MMOL/L (ref 3.5–5.2)
PROTHROMBIN TIME: 14.2 SECONDS (ref 11.4–14.4)
QT INTERVAL: 440 MS
QT INTERVAL: 442 MS
QTC INTERVAL: 430 MS
QTC INTERVAL: 446 MS
RBC # BLD AUTO: 4.76 10*6/MM3 (ref 4.14–5.8)
SODIUM SERPL-SCNC: 140 MMOL/L (ref 136–145)
STRESS BASELINE BP: NORMAL MMHG
STRESS BASELINE HR: 56 BPM
STRESS O2 SAT REST: 97 %
STRESS PERCENT HR: 60 %
STRESS POST EXERCISE DUR MIN: 4 MIN
STRESS POST EXERCISE DUR SEC: 0 SEC
STRESS POST O2 SAT PEAK: 96 %
STRESS POST PEAK BP: NORMAL MMHG
STRESS POST PEAK HR: 83 BPM
STRESS TARGET HR: 139 BPM
STRESS TARGET HR: 139 BPM
T4 FREE SERPL-MCNC: 1.03 NG/DL (ref 0.93–1.7)
TRIGL SERPL-MCNC: 310 MG/DL (ref 0–150)
TROPONIN T SERPL-MCNC: 0.22 NG/ML (ref 0–0.03)
TSH SERPL DL<=0.05 MIU/L-ACNC: 9.73 UIU/ML (ref 0.27–4.2)
VLDLC SERPL-MCNC: 51 MG/DL (ref 5–40)
WBC NRBC COR # BLD: 7.21 10*3/MM3 (ref 3.4–10.8)

## 2022-03-22 PROCEDURE — 93306 TTE W/DOPPLER COMPLETE: CPT | Performed by: INTERNAL MEDICINE

## 2022-03-22 PROCEDURE — 99232 SBSQ HOSP IP/OBS MODERATE 35: CPT | Performed by: INTERNAL MEDICINE

## 2022-03-22 PROCEDURE — 93010 ELECTROCARDIOGRAM REPORT: CPT | Performed by: INTERNAL MEDICINE

## 2022-03-22 PROCEDURE — A9555 RB82 RUBIDIUM: HCPCS | Performed by: INTERNAL MEDICINE

## 2022-03-22 PROCEDURE — 84443 ASSAY THYROID STIM HORMONE: CPT | Performed by: NURSE PRACTITIONER

## 2022-03-22 PROCEDURE — 80048 BASIC METABOLIC PNL TOTAL CA: CPT | Performed by: NURSE PRACTITIONER

## 2022-03-22 PROCEDURE — 85027 COMPLETE CBC AUTOMATED: CPT | Performed by: NURSE PRACTITIONER

## 2022-03-22 PROCEDURE — 0 RUBIDIUM CHLORIDE: Performed by: INTERNAL MEDICINE

## 2022-03-22 PROCEDURE — 93005 ELECTROCARDIOGRAM TRACING: CPT | Performed by: NURSE PRACTITIONER

## 2022-03-22 PROCEDURE — 99239 HOSP IP/OBS DSCHRG MGMT >30: CPT | Performed by: INTERNAL MEDICINE

## 2022-03-22 PROCEDURE — 80061 LIPID PANEL: CPT | Performed by: NURSE PRACTITIONER

## 2022-03-22 PROCEDURE — 84439 ASSAY OF FREE THYROXINE: CPT | Performed by: INTERNAL MEDICINE

## 2022-03-22 PROCEDURE — 78431 MYOCRD IMG PET RST&STRS CT: CPT

## 2022-03-22 PROCEDURE — 93017 CV STRESS TEST TRACING ONLY: CPT

## 2022-03-22 PROCEDURE — 93306 TTE W/DOPPLER COMPLETE: CPT

## 2022-03-22 PROCEDURE — 78431 MYOCRD IMG PET RST&STRS CT: CPT | Performed by: INTERNAL MEDICINE

## 2022-03-22 PROCEDURE — 25010000002 REGADENOSON 0.4 MG/5ML SOLUTION: Performed by: INTERNAL MEDICINE

## 2022-03-22 PROCEDURE — 85610 PROTHROMBIN TIME: CPT | Performed by: NURSE PRACTITIONER

## 2022-03-22 PROCEDURE — 93018 CV STRESS TEST I&R ONLY: CPT | Performed by: INTERNAL MEDICINE

## 2022-03-22 PROCEDURE — 83735 ASSAY OF MAGNESIUM: CPT | Performed by: NURSE PRACTITIONER

## 2022-03-22 RX ORDER — METHION/INOS/CHOL BT/B COM/LIV 110MG-86MG
100 CAPSULE ORAL DAILY
Qty: 3 TABLET | Refills: 0 | Status: SHIPPED | OUTPATIENT
Start: 2022-03-23 | End: 2022-03-26

## 2022-03-22 RX ORDER — HYDROXYZINE HYDROCHLORIDE 10 MG/1
10 TABLET, FILM COATED ORAL 3 TIMES DAILY PRN
Status: DISCONTINUED | OUTPATIENT
Start: 2022-03-22 | End: 2022-03-22 | Stop reason: HOSPADM

## 2022-03-22 RX ORDER — DIPHENOXYLATE HYDROCHLORIDE AND ATROPINE SULFATE 2.5; .025 MG/1; MG/1
1 TABLET ORAL DAILY
Status: DISCONTINUED | OUTPATIENT
Start: 2022-03-23 | End: 2022-03-22 | Stop reason: HOSPADM

## 2022-03-22 RX ORDER — LORAZEPAM 2 MG/ML
2 INJECTION INTRAMUSCULAR
Status: DISCONTINUED | OUTPATIENT
Start: 2022-03-22 | End: 2022-03-22 | Stop reason: HOSPADM

## 2022-03-22 RX ORDER — FLECAINIDE ACETATE 50 MG/1
50 TABLET ORAL EVERY 12 HOURS SCHEDULED
Status: DISCONTINUED | OUTPATIENT
Start: 2022-03-22 | End: 2022-03-22 | Stop reason: HOSPADM

## 2022-03-22 RX ORDER — LOSARTAN POTASSIUM 25 MG/1
25 TABLET ORAL
Qty: 90 TABLET | Refills: 3 | Status: SHIPPED | OUTPATIENT
Start: 2022-03-22

## 2022-03-22 RX ORDER — MULTIPLE VITAMINS W/ MINERALS TAB 9MG-400MCG
1 TAB ORAL DAILY
COMMUNITY

## 2022-03-22 RX ORDER — FOLIC ACID 1 MG/1
1 TABLET ORAL DAILY
Status: DISCONTINUED | OUTPATIENT
Start: 2022-03-23 | End: 2022-03-22 | Stop reason: HOSPADM

## 2022-03-22 RX ORDER — LOSARTAN POTASSIUM 25 MG/1
25 TABLET ORAL
Status: DISCONTINUED | OUTPATIENT
Start: 2022-03-22 | End: 2022-03-22 | Stop reason: HOSPADM

## 2022-03-22 RX ORDER — ZINC GLUCONATE 50 MG
50 TABLET ORAL DAILY
COMMUNITY

## 2022-03-22 RX ORDER — DIPHENOXYLATE HYDROCHLORIDE AND ATROPINE SULFATE 2.5; .025 MG/1; MG/1
1 TABLET ORAL DAILY
Qty: 3 TABLET | Refills: 0 | Status: SHIPPED | OUTPATIENT
Start: 2022-03-23 | End: 2022-03-26

## 2022-03-22 RX ORDER — FLECAINIDE ACETATE 50 MG/1
50 TABLET ORAL EVERY 12 HOURS SCHEDULED
Qty: 60 TABLET | Refills: 0 | Status: SHIPPED | OUTPATIENT
Start: 2022-03-22 | End: 2022-05-03 | Stop reason: SDUPTHER

## 2022-03-22 RX ORDER — LORAZEPAM 1 MG/1
1 TABLET ORAL
Status: DISCONTINUED | OUTPATIENT
Start: 2022-03-22 | End: 2022-03-22 | Stop reason: HOSPADM

## 2022-03-22 RX ORDER — LORAZEPAM 1 MG/1
2 TABLET ORAL
Status: DISCONTINUED | OUTPATIENT
Start: 2022-03-22 | End: 2022-03-22 | Stop reason: HOSPADM

## 2022-03-22 RX ORDER — FOLIC ACID 1 MG/1
1 TABLET ORAL DAILY
Qty: 3 TABLET | Refills: 0 | Status: SHIPPED | OUTPATIENT
Start: 2022-03-23 | End: 2022-03-26

## 2022-03-22 RX ORDER — LORAZEPAM 2 MG/ML
1 INJECTION INTRAMUSCULAR
Status: DISCONTINUED | OUTPATIENT
Start: 2022-03-22 | End: 2022-03-22 | Stop reason: HOSPADM

## 2022-03-22 RX ADMIN — HYDROXYZINE HYDROCHLORIDE 10 MG: 10 TABLET, FILM COATED ORAL at 11:37

## 2022-03-22 RX ADMIN — Medication 10 ML: at 08:20

## 2022-03-22 RX ADMIN — RUBIDIUM CHLORIDE RB-82 1 DOSE: 150 INJECTION, SOLUTION INTRAVENOUS at 14:12

## 2022-03-22 RX ADMIN — METOPROLOL SUCCINATE 50 MG: 50 TABLET, EXTENDED RELEASE ORAL at 08:19

## 2022-03-22 RX ADMIN — RUBIDIUM CHLORIDE RB-82 1 DOSE: 150 INJECTION, SOLUTION INTRAVENOUS at 13:58

## 2022-03-22 RX ADMIN — REGADENOSON 0.4 MG: 0.08 INJECTION, SOLUTION INTRAVENOUS at 14:01

## 2022-03-22 RX ADMIN — ASPIRIN 81 MG 81 MG: 81 TABLET ORAL at 08:19

## 2022-03-22 RX ADMIN — SERTRALINE HYDROCHLORIDE 50 MG: 50 TABLET ORAL at 08:19

## 2022-03-22 RX ADMIN — LOSARTAN POTASSIUM 25 MG: 25 TABLET, FILM COATED ORAL at 17:23

## 2022-03-22 NOTE — CASE MANAGEMENT/SOCIAL WORK
Discharge Planning Assessment  [facility]     Patient Name: Asim Priest  MRN: [id number]  Today's Date: 3/22/2022    Admit Date: 3/21/2022     Discharge Needs Assessment     Row Name 03/22/22 1051       Living Environment    People in Home spouse    Current Living Arrangements home    Primary Care Provided by self    Provides Primary Care For no one    Family Caregiver if Needed spouse    Quality of Family Relationships helpful;involved;supportive    Able to Return to Prior Arrangements yes       Resource/Environmental Concerns    Resource/Environmental Concerns none    Transportation Concerns none       Transition Planning    Patient/Family Anticipates Transition to home with family    Patient/Family Anticipated Services at Transition     Transportation Anticipated family or friend will provide       Discharge Needs Assessment    Equipment Currently Used at Home none    Concerns to be Addressed denies needs/concerns at this time    Discharge Coordination/Progress Spoke with patient and wife at bedside.  Patient resides in a house in [place] with his wife.  Patient's only DME is a cpap, but states that he never received the cpap from the company as there is an issue with his address.  CM is unable to find an order for cpap, and any referrals for cpap. Patient does have an advanced directive.  Patient's PCP is Breezy Horn, and he gets his prescriptions filled at [facility] off of [facility].  Family will be able to transport him home at discharge.  Patient's only requests prior to discharge is a cpap machine and refills of his medications.               Discharge Plan     Row Name 03/22/22 1056       Plan    Plan Home    Patient/Family in Agreement with Plan yes    Plan Comments Spoke with patient and wife at bedside.  Patient resides in a house in [place] with his wife.  Patient's only DME is a cpap, but states that he never received the cpap from the  company as there is an issue with his address.  CM is unable to find an order for cpap, and any referrals for cpap. Patient does have an advanced directive.  Patient's PCP is Breezy Horn, and he gets his prescriptions filled at [facility] off of [facility].  Family will be able to transport him home at discharge.  Patient's only requests prior to discharge is a cpap machine and refills of his medications.    Final Discharge Disposition Code 01 - home or self-care              Continued Care and Services - Admitted Since 3/21/2022    Coordination has not been started for this encounter.       Expected Discharge Date and Time     Expected Discharge Date Expected Discharge Time    Mar 24, 2022          Demographic Summary    No documentation.                Functional Status     Row Name 03/22/22 1051       Functional Status    Usual Activity Tolerance good    Current Activity Tolerance good       Functional Status, IADL    Medications independent    Meal Preparation independent    Housekeeping independent    Laundry independent    Shopping independent       Mental Status    General Appearance WDL WDL               Psychosocial    No documentation.                Abuse/Neglect    No documentation.                Legal    No documentation.                Substance Abuse    No documentation.                Patient Forms    No documentation.                   Peri Dowling, RN

## 2022-03-22 NOTE — PROGRESS NOTES
[facility] at [facility]  IP Progress Note      Chief Complaint/Reason for service: #1 A. fib RVR #2 chest pain with elevated troponin    Subjective   Subjective: 56-year-old male with atrial fibrillation 2007 saw  She recently complaining of palpitations..  He has been on metoprolol for these complaints.  The patient states 1 year ago he had problems with palpitations lasted about 40 minutes.  He then had no symptoms or issues until after he had Covid in January.  Since that time he had multiple episodes of palpitations which is less short periods of time.  This episode however was quite profound where he broke out in a sweat became lightheaded and lay down.  He denied any tightness or heaviness in his chest with this.. The patient is very active and works 2 jobs.  When his heart is not racing he has no dyspnea no fatigue no tightness heaviness squeezing or pressure in his chest jaw throat or arms.  He denies edema.  He is never had blood in his stool.  He is never had a stroke or neurologic event.  He has had no further episodes of syncope    Past medical, surgical, social and family history reviewed in the patient's electronic medical record.    Objective     Vital Sign Min/Max for last 24 hours  Temp  Min: 97.7 °F (36.5 °C)  Max: 98 °F (36.7 °C)   BP  Min: 111/77  Max: 162/105   Pulse  Min: 55  Max: 206   Resp  Min: 16  Max: 18   SpO2  Min: 89 %  Max: 100 %   No data recorded      Intake/Output Summary (Last 24 hours) at 3/22/2022 0932  Last data filed at 3/22/2022 0512  Gross per 24 hour   Intake --   Output 500 ml   Net -500 ml             Current Facility-Administered Medications:   •  aspirin chewable tablet 81 mg, 81 mg, Oral, Daily, Pooja Bravo APRN, 81 mg at 03/22/22 0819  •  [START ON 3/23/2022] thiamine (VITAMIN B-1) tablet 100 mg, 100 mg, Oral, Daily **AND** [START ON 3/23/2022] multivitamin (THERAGRAN) tablet 1 tablet, 1 tablet, Oral, Daily **AND** [START ON  "3/23/2022] folic acid (FOLVITE) tablet 1 mg, 1 mg, Oral, Daily, Hever Reilly III, DO  •  LORazepam (ATIVAN) tablet 1 mg, 1 mg, Oral, Q2H PRN **OR** LORazepam (ATIVAN) injection 1 mg, 1 mg, Intravenous, Q2H PRN **OR** LORazepam (ATIVAN) tablet 2 mg, 2 mg, Oral, Q1H PRN **OR** LORazepam (ATIVAN) injection 2 mg, 2 mg, Intravenous, Q1H PRN **OR** LORazepam (ATIVAN) injection 2 mg, 2 mg, Intravenous, Q15 Min PRN **OR** LORazepam (ATIVAN) injection 2 mg, 2 mg, Intramuscular, Q15 Min PRN, Hever Reilly III, DO  •  melatonin tablet 5 mg, 5 mg, Oral, Nightly PRN, Pooja Bravo APRN  •  metoprolol succinate XL (TOPROL-XL) 24 hr tablet 50 mg, 50 mg, Oral, Daily, Pooja Bravo APRN, 50 mg at 03/22/22 0819  •  nitroglycerin (NITROSTAT) SL tablet 0.4 mg, 0.4 mg, Sublingual, Q5 Min PRN, Pooja Bravo APRN  •  sertraline (ZOLOFT) tablet 50 mg, 50 mg, Oral, Daily, Pooja Bravo APRN, 50 mg at 03/22/22 0819  •  sodium chloride 0.9 % flush 10 mL, 10 mL, Intravenous, PRN, Elvin Lujan MD  •  sodium chloride 0.9 % flush 10 mL, 10 mL, Intravenous, Q12H, Pooja Bravo APRN, 10 mL at 03/22/22 0820  •  sodium chloride 0.9 % flush 10 mL, 10 mL, Intravenous, PRN, Pooja Bravo APRAMPARO    Physical Exam: General well-developed well-nourished male not dyspneic not tachypneic current heart rate 58 with a normal blood pressure        HEENT: No JVP or bruits.  Tongue midline.  Dentition good.  No icterus/EOMI       Respiratory: Equal bilateral symmetrical expansion\" bilaterally       Cardiovascular: Regular rate and rhythm without murmur gallop or click and no edema to palpation       GI: Soft flat nontender positive bowel sounds       Lower Extremities: No lesions       Neuro: Facial expressions are symmetrical.  Handgrip strength equal bilaterally 5/5       Skin: Warm and dry no edema to palpation       Psych: Pleasant affect oriented x3    Results Review: Peak troponin is " 0.223.  Heart rates now 57-70.  TSH is elevated at 9.1    Radiology Results:  Imaging Results (Last 72 Hours)     Procedure Component Value Units Date/Time    XR Chest 1 View [[id number]] Collected: 03/21/22 1724     Updated: 03/21/22 1727    Narrative:      XR CHEST 1 VW-     Date of Exam: 3/21/2022 5:11 PM     Indication: Chest Pain triage protocol.     Comparison Exams: None available.     Technique: Single AP chest radiograph     FINDINGS:  The lungs are clear. The heart and mediastinal contours appear normal.  The pulmonary vasculature appears normal. The osseous structures appear  intact.       Impression:      No acute cardiopulmonary process identified.     This report was finalized on 3/21/2022 5:24 PM by Daniel Patel MD.             EKG: First EKG shows A. fib RVR diffuse ST segment depression.  Follow-up EKG shows sinus rhythm no ischemia    ECHO: Pending    Tele: Sinus rhythm    Assessment   Assessment/Plan: 1 patient presents with sudden onset of palpitations with weakness and diaphoresis.  It persisted quite a while so the patient was brought to the ER he underwent cardioversion.  This occurred despite being on metoprolol.  He has had multiple episodes of palpitations since January.  When he is not having arrhythmia he denies any tightness or heaviness in his chest or dyspnea.  The arrhythmia is quite fast and the rhythm is irregular.  This looks more like an SVT.  I will have the EP reviewed the EKG.  We will plan for stress PET today and an echocardiogram.  Whether this is A. fib flutter or SVT if the scans are negative we will start him on flecainide.  Since the patient's arrhythmias begin to occur after Covid may be worthwhile to get a cardiac MRI to look for myocardial or pericardial inflammation  The patient's cardiac enzymes are mildly elevated.  This could be due to the rapid rate with his SVT and following cardioversion.  When he has not an SVT he has no exertional angina or dyspnea.  We  will perform stress PET to rule out ischemic heart disease    Adolph Perez MD  03/22/22  09:32 EDT   This is an addendum to the earlier note.  The patient's nuclear stress test was normal and EF is normal.  Her EP reviewed the EKG and confirmed this is SVT and not A. fib.  We will start the patient on flecainide.  His QTC is normal.  The patient can be discharged home and follow-up with Dr. Landeros in 6 weeks to discuss ablation  He should return for significant SVT

## 2022-03-22 NOTE — PLAN OF CARE
Goal Outcome Evaluation:     VSS. NSR confirmed by this AM EKG. RA. Wife at bedside. NPO since MN. Dr. Coley consult this AM.

## 2022-03-22 NOTE — DISCHARGE SUMMARY
[facility]  DISCHARGE SUMMARY    Patient Name: Asim Priest  : 1965  MRN: [id number]    Date of Admission: 3/21/2022  4:12 PM  Date of Discharge:  3/22/2022  Primary Care Physician: Breezy Horn NP-C    Consults     Date and Time Order Name Status Description    3/22/2022 12:34 AM Inpatient Cardiology Consult Completed           Hospital Course     Presenting Problem:   Paroxysmal atrial fibrillation with rapid ventricular response (HCC) [I48.0]    Active Hospital Problems    Diagnosis  POA   • SVT (supraventricular tachycardia) (HCC) [I47.1]  Yes   • Elevated troponin [R77.8]  Yes   • A-fib (HCC) [I48.91]  Yes   • HTN (hypertension) [I10]  Yes   • HLD (hyperlipidemia) [E78.5]  Yes   • RACHEL (obstructive sleep apnea) [G47.33]  Yes   • Hypoxia [R09.02]  Yes   • Paroxysmal atrial fibrillation with rapid ventricular response (HCC) [I48.0]  Yes      Resolved Hospital Problems   No resolved problems to display.          Hospital Course:  Asim Priest is a 56 y.o. male with history of A. fib, hypertension, hyperlipidemia, Covid infection 2022, anxiety, presents to the ED with complaints of palpitations.        Assessment and Plan:     SVT  Elevated troponin  Hypoxia  -- troponin of 0.053 and 0.121, 0.223  --Given aspirin and Lovenox 1 mg/kg in the ED   --Patient has a history of an isolated episode of A. fib in   --has been wearing a heart monitor for the past week  --Follows with Dr. Coley  --cardiology consulted/and followed during hospitalization.  I d/w Dr. Perez who states that stress test was normal and that patient had SVT not afib.  He started the patient on Flecainide and he needs to f/u with Dr. Landeros in 4-6 weeks regarding ?ablation  --Continue aspirin and metoprolol  --echo showed normal Systolic function, mild MR          HTN  HLD  --metoprolol     Subclinical Hypothyroidism  --elevated TSH and normal FT4.  D/w patient  need f/u for with PCP and risk of developing overt hypothyroidism, will need f/u TSH    Discharge Follow Up Recommendations for outpatient labs/diagnostics:   f/u with PCP in 1 week  F/u with Dr. Landeros in 4-6 weeks for ?ablation    Day of Discharge     HPI:   Denies any CP or any further palpitations.  Ready to go home.     Review of Systems  Gen- No fevers, chills  CV- No chest pain, palpitations  Resp- No cough, dyspnea  GI- No N/V/D, abd pain        Vital Signs:   Temp:  [97.7 °F (36.5 °C)-98 °F (36.7 °C)] 97.7 °F (36.5 °C)  Heart Rate:  [] 72  Resp:  [16-18] 18  BP: (111-162)/() 145/90      Physical Exam:  Constitutional: Awake, alert  Eyes: PERRLA, sclerae anicteric, no conjunctival injection  HENT: NCAT, mucous membranes moist  Neck: Supple, no thyromegaly, no lymphadenopathy, trachea midline  Respiratory: Clear to auscultation bilaterally, nonlabored respirations   Cardiovascular: RRR, no murmurs, rubs, or gallops, palpable pedal pulses bilaterally  Gastrointestinal: Positive bowel sounds, soft, nontender, nondistended  Musculoskeletal: No bilateral ankle edema, no clubbing or cyanosis to extremities  Psychiatric: Appropriate affect, cooperative  Neurologic: Oriented x 3, strength symmetric in all extremities, Cranial Nerves grossly intact to confrontation, speech clear  Skin: No rashes      Pertinent  and/or Most Recent Results     LAB RESULTS:      Lab 03/22/22  0300 03/21/22  1751   WBC 7.21 9.49   HEMOGLOBIN 14.4 15.0   HEMATOCRIT 42.0 43.1   PLATELETS 168 166   NEUTROS ABS  --  7.39*   IMMATURE GRANS (ABS)  --  0.03   LYMPHS ABS  --  1.45   MONOS ABS  --  0.48   EOS ABS  --  0.09   MCV 88.2 87.6   PROTIME 14.2  --          Lab 03/22/22  0300 03/21/22  1635   SODIUM 140 136   POTASSIUM 3.9 4.1   CHLORIDE 108* 102   CO2 22.0 22.0   ANION GAP 10.0 12.0   BUN 11 13   CREATININE 0.97 1.00   EGFR 91.1 87.8   GLUCOSE 89 109*   CALCIUM 8.6 9.5   MAGNESIUM 2.1  --    TSH 9.730*  --          Lab  03/21/22  1635   TOTAL PROTEIN 7.3   ALBUMIN 4.80   GLOBULIN 2.5   ALT (SGPT) 31   AST (SGOT) 24   BILIRUBIN 0.5   ALK PHOS 81   LIPASE 27         Lab 03/22/22  0300 03/21/22  2340 03/21/22  1751 03/21/22  1635   PROBNP  --   --   --  132.8   TROPONIN T  --  0.223* 0.121* 0.053*   PROTIME 14.2  --   --   --    INR 1.13  --   --   --          Lab 03/22/22  0300   CHOLESTEROL 166   LDL CHOL 83   HDL CHOL 32*   TRIGLYCERIDES 310*             Brief Urine Lab Results     None        Microbiology Results (last 10 days)     Procedure Component Value - Date/Time    COVID-19 and FLU A/B PCR - Swab, Nasopharynx [[id number]]  (Normal) Collected: 03/21/22 2047    Lab Status: Final result Specimen: Swab from Nasopharynx Updated: 03/21/22 2130     COVID19 Not Detected     Influenza A PCR Not Detected     Influenza B PCR Not Detected    Narrative:      Fact sheet for providers: https://www.fda.gov/media/397522/download    Fact sheet for patients: https://www.fda.gov/media/722399/download    Test performed by PCR.          Adult Transthoracic Echo Complete With Contrast if Necessary Per Protocol    Result Date: 3/22/2022  · Estimated right ventricular systolic pressure from tricuspid regurgitation is normal (<35 mmHg). · Estimated left ventricular EF = 60% Left ventricular systolic function is normal. · There is mild MR · Normal LV systolic wall motion      XR Chest 1 View    Result Date: 3/21/2022  XR CHEST 1 VW-  Date of Exam: 3/21/2022 5:11 PM  Indication: Chest Pain triage protocol.  Comparison Exams: None available.  Technique: Single AP chest radiograph  FINDINGS: The lungs are clear. The heart and mediastinal contours appear normal. The pulmonary vasculature appears normal. The osseous structures appear intact.      No acute cardiopulmonary process identified.  This report was finalized on 3/21/2022 5:24 PM by Daniel Patel MD.      Stress Test With Pet Myocardial Perfusion    Result Date: 3/22/2022  · LexiPet scan within  normal limits. · REST EF = 61% STRESS EF = 67%. · Minor LAD coronary calcification noted.                Results for orders placed during the hospital encounter of 03/21/22    Adult Transthoracic Echo Complete With Contrast if Necessary Per Protocol    Interpretation Summary  · Estimated right ventricular systolic pressure from tricuspid regurgitation is normal (<35 mmHg).  · Estimated left ventricular EF = 60% Left ventricular systolic function is normal.  · There is mild MR  · Normal LV systolic wall motion      Plan for Follow-up of Pending Labs/Results:     Discharge Details        Discharge Medications      New Medications      Instructions Start Date   flecainide 50 MG tablet  Commonly known as: TAMBOCOR   50 mg, Oral, Every 12 Hours Scheduled      folic acid 1 MG tablet  Commonly known as: FOLVITE   1 mg, Oral, Daily   Start Date: March 23, 2022     multivitamin tablet tablet   1 tablet, Oral, Daily   Start Date: March 23, 2022     thiamine 100 MG tablet tablet  Commonly known as: VITAMIN B-1   100 mg, Oral, Daily   Start Date: March 23, 2022        Continue These Medications      Instructions Start Date   aspirin 81 MG chewable tablet   81 mg, Oral, Daily, OTC      metoprolol succinate XL 50 MG 24 hr tablet  Commonly known as: TOPROL-XL   50 mg, Oral, Daily      multivitamin with minerals tablet tablet   1 tablet, Oral, Daily, OTC      nitroglycerin 0.4 MG SL tablet  Commonly known as: NITROSTAT   0.4 mg, Sublingual, Every 5 Minutes PRN, Take no more than 3 doses in 15 minutes.      sertraline 50 MG tablet  Commonly known as: ZOLOFT   50 mg, Oral, Daily      Zinc 50 MG tablet   1 tablet, Oral, Daily, OTC         Stop These Medications    zolpidem 10 MG tablet  Commonly known as: AMBIEN            Allergies   Allergen Reactions   • Penicillins Other (See Comments)     Reaction unknown, was advised by family          Discharge Disposition:  Home or Self Care    Diet:  Hospital:  Diet Order   Procedures   •  Diet Regular       Activity:      Restrictions or Other Recommendations:         CODE STATUS:    Code Status and Medical Interventions:   Ordered at: 03/21/22 2028     Level Of Support Discussed With:    Patient     Code Status (Patient has no pulse and is not breathing):    CPR (Attempt to Resuscitate)     Medical Interventions (Patient has pulse or is breathing):    Full Support       No future appointments.    Additional Instructions for the Follow-ups that You Need to Schedule     Discharge Follow-up with PCP   As directed       Currently Documented PCP:    Breezy Horn, RIN[facility]    PCP Phone Number:    [phone]     Follow Up Details: in 1 week         Discharge Follow-up with Specified Provider: with Dr. Landeros   As directed      To: with Dr. Landeros    Follow Up Details: in 4-6 weeks                     Jaden Kowalski MD  03/22/22      Time Spent on Discharge:  I spent  37 minutes on this discharge activity which included: face-to-face encounter with the patient, reviewing the data in the system, coordination of the care with the nursing staff as well as consultants, documentation, and entering orders.

## 2022-03-23 NOTE — PAYOR COMM NOTE
"Ye Zaidi (56 y.o. Male)             Date of Birth   1965    Social Security Number       Address   [address] [place] [address]    Home Phone   [phone]    MRN   [id number]       Buddhism   None    Marital Status                               Admission Date   3/21/22    Admission Type   Emergency    Admitting Provider   Jaden Kowalski MD    Attending Provider       Department, Room/Bed   [address], S452/1       Discharge Date   3/22/2022    Discharge Disposition   Home or Self Care    Discharge Destination                               Attending Provider: (none)   Allergies: Penicillins    Isolation: None   Infection: None   Code Status: Prior   Advance Care Planning Activity    Ht: 182.9 cm (72\")   Wt: 110 kg (242 lb)    Admission Cmt: None   Principal Problem: None                Active Insurance as of 3/21/2022     Primary Coverage     Payor Plan Insurance Group Employer/Plan Group    [employer] [facility] EMPL[employer]EE [id number]     Payor Plan Address Payor Plan Phone Number Payor Plan Fax Number Effective Dates    [address] [phone]  2022 - None Entered    [place] [address]       Subscriber Name Subscriber Birth Date Member ID       YE ZAIDI 1965 [id number]                 Emergency Contacts      (Rel.) Home Phone Work Phone Mobile Phone    DYANERIKA (Spouse) [phone] -- --    WILLCHAIMAARON (Relative) [phone] -- --            Insurance Information                [facility] EMPL[employer] Phone: [phone]    Subscriber: Ye Zaidi Subscriber#: [id number]    Group#: [id number] Precert#: [id number]             History & Physical      Hever Reilly III, DO at 22 1918              [facility] Medicine Services  HISTORY AND PHYSICAL    Patient Name: Ye Zaidi  : 1965  MRN: " [id number]  Primary Care Physician: Breezy Horn NP-C  Date of admission: 3/21/2022    Subjective   Subjective     Chief Complaint:  Palpitations     HPI:  Asim Priest is a 56 y.o. male with history of A. fib, hypertension, hyperlipidemia, Covid infection 1/2022, anxiety, presents to the ED with complaints of palpitations.  Patient has been wearing a heart monitor for the past week.  He was told if he became symptomatic to push the button on his monitor, and to go to the ED for evaluation.  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  He has been having similar symptoms recently but never this severe.  He denies chest pain, edema, nausea, vomiting, abdominal pain, syncope, or any other complaints at this time.  He has had a lingering cough since having Covid in January.  Patient is supposed to be scheduled for a stress test in the near future with Dr. Coley.  Upon arrival to the ED he was found to have a heart rate of 198 and EKG showed afib.  Patient underwent a successful cardioversion in the ED.  Pertinent labs include troponin of 0.053 and 0.121.  He was given aspirin, Lopressor, and Lovenox 1 mg/kg in the ED.  Patient is being admitted to the Hospitalist for further evaluation and management.    COVID Details:        Symptoms: [] NONE [] Fever [x]  Cough [x] Shortness of breath [] Change in taste or smell  The patient qualifies to receive the vaccine, but they have not yet received it.    Review of Systems   Constitutional: Positive for diaphoresis. Negative for appetite change, chills, fatigue and fever.   Eyes: Negative.    Respiratory: Positive for cough and shortness of breath. Negative for wheezing.    Cardiovascular: Positive for palpitations. Negative for chest pain and leg swelling.   Gastrointestinal: Negative.    Endocrine: Negative.    Genitourinary: Negative.    Musculoskeletal: Negative.     Skin: Negative.    Allergic/Immunologic: Negative.    Neurological: Positive for dizziness, light-headedness and headaches.   Hematological: Negative.    Psychiatric/Behavioral: Negative.         All other systems reviewed and are negative.     Personal History     Past Medical History:   Diagnosis Date   • Anxiety    • Atrial fibrillation (HCC) 2007   • Dizziness    • Hyperlipidemia    • Hypertension    • Insomnia    • Mitral valve prolapse 2000   • Palpitations    • Precordial pain    • Sleep apnea        Past Surgical History:   Procedure Laterality Date   • CARDIAC CATHETERIZATION  2018, 1997    negative findings   • KNEE SURGERY     • SHOULDER SURGERY         Family History:  family history includes Arrhythmia in his mother; Heart attack in his father, maternal grandfather, and paternal grandfather; Heart disease in his father. Otherwise pertinent FHx was reviewed and unremarkable.     Social History:  reports that he has never smoked. He has never used smokeless tobacco. He reports current alcohol use. He reports that he does not use drugs.  Social History     Social History Narrative   • Not on file       Medications:  Zolpidem Tartrate, aspirin, metoprolol succinate XL, nitroglycerin, and sertraline    Allergies   Allergen Reactions   • Penicillins Other (See Comments)     Reaction unknown, was advised by family        Objective   Objective     Vital Signs:   Temp:  [98 °F (36.7 °C)] 98 °F (36.7 °C)  Heart Rate:  [] 75  Resp:  [18] 18  BP: (111-162)/() 128/89    Physical Exam   Constitutional: Awake, alert, sitting up in bed, family at bedside  Eyes: PERRLA, sclerae anicteric, no conjunctival injection  HENT: NCAT, mucous membranes moist  Neck: Supple, no thyromegaly, no lymphadenopathy, trachea midline  Respiratory: Clear to auscultation bilaterally, nonlabored respirations   Cardiovascular: RRR, no murmurs, rubs, or gallops, palpable pedal pulses bilaterally  Gastrointestinal: Positive bowel  sounds, soft, nontender, nondistended  Musculoskeletal: No bilateral ankle edema, no clubbing or cyanosis to extremities  Psychiatric: Appropriate affect, cooperative  Neurologic: Oriented x 3, strength symmetric in all extremities, Cranial Nerves grossly intact to confrontation, speech clear  Skin: No rashes       Result Review:  I have personally reviewed the results from the time of this admission to 03/21/22 7:18 PM EDT and agree with these findings:  [x]  Laboratory  []  Microbiology  [x]  Radiology  [x]  EKG/Telemetry   []  Cardiology/Vascular   []  Pathology  [x]  Old records  []  Other:  Most notable findings include:       LAB RESULTS:      Lab 03/21/22  1751   WBC 9.49   HEMOGLOBIN 15.0   HEMATOCRIT 43.1   PLATELETS 166   NEUTROS ABS 7.39*   IMMATURE GRANS (ABS) 0.03   LYMPHS ABS 1.45   MONOS ABS 0.48   EOS ABS 0.09   MCV 87.6         Lab 03/21/22  1635   SODIUM 136   POTASSIUM 4.1   CHLORIDE 102   CO2 22.0   ANION GAP 12.0   BUN 13   CREATININE 1.00   EGFR 87.8   GLUCOSE 109*   CALCIUM 9.5         Lab 03/21/22  1635   TOTAL PROTEIN 7.3   ALBUMIN 4.80   GLOBULIN 2.5   ALT (SGPT) 31   AST (SGOT) 24   BILIRUBIN 0.5   ALK PHOS 81   LIPASE 27         Lab 03/21/22  1751 03/21/22  1635   PROBNP  --  132.8   TROPONIN T 0.121* 0.053*                   Microbiology Results (last 10 days)     ** No results found for the last 240 hours. **          XR Chest 1 View    Result Date: 3/21/2022  XR CHEST 1 VW-  Date of Exam: 3/21/2022 5:11 PM  Indication: Chest Pain triage protocol.  Comparison Exams: None available.  Technique: Single AP chest radiograph  FINDINGS: The lungs are clear. The heart and mediastinal contours appear normal. The pulmonary vasculature appears normal. The osseous structures appear intact.      Impression: No acute cardiopulmonary process identified.  This report was finalized on 3/21/2022 5:24 PM by Daniel Patel MD.        Results for orders placed during the hospital encounter of  03/03/20    Adult Stress Echo W/ Cont or Stress Agent if Necessary Per Protocol    Interpretation Summary  · Resting Echocardiogram Findings:  · Normal left ventricular cavity size and wall thickness noted. All left ventricular wall segments contract normally.  · Estimated EF appears to be in the range of 61 - 65%.  · The valve appears trileaflet. The aortic valve is abnormal in structure. The valve exhibits sclerosis. No aortic valve regurgitation is present. No aortic valve stenosis is present  · The mitral valve is normal in structure. No mitral valve regurgitation is present. No significant mitral valve stenosis is present.  · Tricuspid valve not well visualized. The tricuspid valve is grossly normal. No tricuspid valve regurgitation is present.  · There is no evidence of pericardial effusion.  · Stress Procedure:  · A stress test was performed following the Omi protocol.  · Exercise duration (min) 7 min Exercise duration (sec) 30 sec Estimated workload 10.1 METS  · Baseline Vitals Baseline HR 83 bpm Baseline /98 mmHg Peak Stress Vitals Peak  bpm Peak /92 mmHg Recovery Vitals Recovery HR 82 bpm Recovery /102 mmHg Exercise Data Target HR (85%) 140 bpm Max. Pred. HR (100%) 165 bpm Percent Max Pred HR 87.27 %  · A horizontal ST segment depression of 1 mm in the inferolateral leads was noted during stress (II, III, aVF, V5, V6, V4 and V3), and returning to baseline after less than 1 minute of recovery.  · There were no arrhythmias during stress.  · Abnormal with interpretable ST segment stress ECG interpretation.  · Stress Echo Findings:  · Segment augmentation had a normal response to stress  · Cavity size behaved normally in response to stress. Left ventricular function is normal. Septal wall motion is normal  · Normal stress echo with no significant echocardiographic evidence for myocardial ischemia.      Assessment/Plan   Assessment & Plan       SVT (supraventricular tachycardia)  (HCC)    Elevated troponin    A-fib (HCC)    HTN (hypertension)    HLD (hyperlipidemia)    RACHEL (obstructive sleep apnea)    Hypoxia    Paroxysmal atrial fibrillation with rapid ventricular response (HCC)    Asim Priest is a 56 y.o. male with history of A. fib, hypertension, hyperlipidemia, Covid infection 1/2022, anxiety, presents to the ED with complaints of palpitations.       Assessment and Plan:    A. fib with RVR versus SVT  Elevated troponin  Hypoxia  -- troponin of 0.053 and 0.121   --Given aspirin and Lovenox 1 mg/kg in the ED   --Patient has a history of an isolated episode of A. fib in 2007  --has been wearing a heart monitor for the past week  --Follows with Dr. Coley--will consult in the am  --Continue aspirin and metoprolol  --continuous pulse ox with supplemental oxygen as needed  --npo after midnight  --trend troponin  --EKG in the am  --echo in the am  --am labs    HTN  HLD  --metoprolol  --am labs        DVT prophylaxis: Was given Lovenox 1 mg/kg in the ED    CODE STATUS:    Level Of Support Discussed With: Patient  Code Status (Patient has no pulse and is not breathing): CPR (Attempt to Resuscitate)  Medical Interventions (Patient has pulse or is breathing): Full Support      This note has been completed as part of a split-shared workflow.   Signature: Electronically signed by INDY James, 03/21/22, 8:30 PM EDT.       Attending   Admission Attestation       I have seen and examined the patient, performing an independent face-to-face diagnostic evaluation with plan of care reviewed and developed with the advanced practice clinician (APC).      Brief Summary Statement:   Asim Priest is a 56 y.o. male who states he had palpitations, shortness of breath, dizziness, and diaphoresis that started in the late afternoon while he was at work. He has actually been wearing a cardiac monitor for the last week as prescribed by a cardiologist, and when he had the above  symptoms today he activated the device to being recording and then came to the ED. He denies any chest pain. On Ed arrival he had a rate around 200 and began to c/o chest tightness, so he was elecrtically cardioverted in the ED. He returned to normal sinus rhythm and his symptoms resolved. I reviewed the telemetry strip from ED arrival and his EKG which showed SVT/a-fib. Subsequent EKG showed sinus tach and he eventually moved to a normal rate and rhythm by the time I visited him in his ED room with his family.    Remainder of detailed HPI is as noted by APC and has been reviewed and/or edited by me for completeness.    Attending Physical Exam:  Constitutional: Awake, alert  Eyes: PERRLA, sclerae anicteric, no conjunctival injection  HENT: NCAT, mucous membranes moist  Neck: Supple, no thyromegaly, no lymphadenopathy, trachea midline  Respiratory: Clear to auscultation bilaterally, nonlabored respirations   Cardiovascular: RRR, no murmurs, rubs, or gallops, palpable pedal pulses bilaterally  Gastrointestinal: Positive bowel sounds, soft, nontender, nondistended  Musculoskeletal: No bilateral ankle edema, no clubbing or cyanosis to extremities  Psychiatric: Appropriate affect, cooperative  Neurologic: Oriented x 3, strength symmetric in all extremities, Cranial Nerves grossly intact to confrontation, speech clear  Skin: No rashes, normal turgor    Brief Assessment/Plan :  See detailed assessment and plan developed with APC which I have reviewed and/or edited for completeness.    Patient also confirms daily beer intake and patient and family state possible need for CIWA protocol, which I have added to the orders.    Admission Status: I believe that this patient meets inpatient criteria due to SVT which required cardioversion, as well as need for cardiology consult.      Hever Reilly III, DO  03/21/22                        Electronically signed by Hever Reilly III, DO at 03/22/22 0111           Discharge Summaryl      Peri Maciel RN at 03/22/22 1058          Discharge Planning Assessment  [facility]     Patient Name: Asim Priest  MRN: [id number]  Today's Date: 3/22/2022    Admit Date: 3/21/2022     Discharge Needs Assessment     Row Name 03/22/22 1051       Living Environment    People in Home spouse    Current Living Arrangements home    Primary Care Provided by self    Provides Primary Care For no one    Family Caregiver if Needed spouse    Quality of Family Relationships helpful;involved;supportive    Able to Return to Prior Arrangements yes       Resource/Environmental Concerns    Resource/Environmental Concerns none    Transportation Concerns none       Transition Planning    Patient/Family Anticipates Transition to home with family    Patient/Family Anticipated Services at Transition     Transportation Anticipated family or friend will provide       Discharge Needs Assessment    Equipment Currently Used at Home none    Concerns to be Addressed denies needs/concerns at this time    Discharge Coordination/Progress Spoke with patient and wife at bedside.  Patient resides in a house in [facility] [place] [facility] with his wife.  Patient's only DME is a cpap, but states that he never received the cpap from the company as there is an issue with his address.  CM is unable to find an order for cpap, and any referrals for cpap. Patient does have an advanced directive.  Patient's PCP is Breezy Horn, and he gets his prescriptions filled at [facility] off of [facility].  Family will be able to transport him home at discharge.  Patient's only requests prior to discharge is a cpap machine and refills of his medications.               Discharge Plan     Row Name 03/22/22 1056       Plan    Plan Home    Patient/Family in Agreement with Plan yes    Plan Comments Spoke with patient and wife at bedside.  Patient resides in a house in [place] [place] [facility] with his wife.  Patient's only  DME is a cpap, but states that he never received the cpap from the company as there is an issue with his address.  CM is unable to find an order for cpap, and any referrals for cpap. Patient does have an advanced directive.  Patient's PCP is Breezy Horn, and he gets his prescriptions filled at [facility] in [place] off of [facility].  Family will be able to transport him home at discharge.  Patient's only requests prior to discharge is a cpap machine and refills of his medications.    Final Discharge Disposition Code 01 - home or self-care              Continued Care and Services - Admitted Since 3/21/2022    Coordination has not been started for this encounter.       Expected Discharge Date and Time     Expected Discharge Date Expected Discharge Time    Mar 24, 2022          Demographic Summary    No documentation.                Functional Status     Row Name 03/22/22 1051       Functional Status    Usual Activity Tolerance good    Current Activity Tolerance good       Functional Status, IADL    Medications independent    Meal Preparation independent    Housekeeping independent    Laundry independent    Shopping independent       Mental Status    General Appearance WDL WDL               Psychosocial    No documentation.                Abuse/Neglect    No documentation.                Legal    No documentation.                Substance Abuse    No documentation.                Patient Forms    No documentation.                   Peri Dowling RN      Electronically signed by Peri Maciel RN at 03/22/22 9787     Adolph Perez MD at 03/22/22 0908          [facility]  IP Progress Note      Chief Complaint/Reason for service: #1 A. fib RVR #2 chest pain with elevated troponin    Subjective   Subjective: 56-year-old male with atrial fibrillation 2007 saw  She recently complaining of palpitations..  He has been on metoprolol for these complaints.  The  patient states 1 year ago he had problems with palpitations lasted about 40 minutes.  He then had no symptoms or issues until after he had Covid in January.  Since that time he had multiple episodes of palpitations which is less short periods of time.  This episode however was quite profound where he broke out in a sweat became lightheaded and lay down.  He denied any tightness or heaviness in his chest with this.. The patient is very active and works 2 jobs.  When his heart is not racing he has no dyspnea no fatigue no tightness heaviness squeezing or pressure in his chest jaw throat or arms.  He denies edema.  He is never had blood in his stool.  He is never had a stroke or neurologic event.  He has had no further episodes of syncope    Past medical, surgical, social and family history reviewed in the patient's electronic medical record.    Objective     Vital Sign Min/Max for last 24 hours  Temp  Min: 97.7 °F (36.5 °C)  Max: 98 °F (36.7 °C)   BP  Min: 111/77  Max: 162/105   Pulse  Min: 55  Max: 206   Resp  Min: 16  Max: 18   SpO2  Min: 89 %  Max: 100 %   No data recorded      Intake/Output Summary (Last 24 hours) at 3/22/2022 0932  Last data filed at 3/22/2022 0512  Gross per 24 hour   Intake --   Output 500 ml   Net -500 ml             Current Facility-Administered Medications:   •  aspirin chewable tablet 81 mg, 81 mg, Oral, Daily, Pooja Bravo APRN, 81 mg at 03/22/22 0819  •  [START ON 3/23/2022] thiamine (VITAMIN B-1) tablet 100 mg, 100 mg, Oral, Daily **AND** [START ON 3/23/2022] multivitamin (THERAGRAN) tablet 1 tablet, 1 tablet, Oral, Daily **AND** [START ON 3/23/2022] folic acid (FOLVITE) tablet 1 mg, 1 mg, Oral, Daily, Hever Reilly III, DO  •  LORazepam (ATIVAN) tablet 1 mg, 1 mg, Oral, Q2H PRN **OR** LORazepam (ATIVAN) injection 1 mg, 1 mg, Intravenous, Q2H PRN **OR** LORazepam (ATIVAN) tablet 2 mg, 2 mg, Oral, Q1H PRN **OR** LORazepam (ATIVAN) injection 2 mg, 2 mg, Intravenous, Q1H  "PRN **OR** LORazepam (ATIVAN) injection 2 mg, 2 mg, Intravenous, Q15 Min PRN **OR** LORazepam (ATIVAN) injection 2 mg, 2 mg, Intramuscular, Q15 Min PRN, Hever Reilly III, DO  •  melatonin tablet 5 mg, 5 mg, Oral, Nightly PRN, Manpreet Bravoa W, APRN  •  metoprolol succinate XL (TOPROL-XL) 24 hr tablet 50 mg, 50 mg, Oral, Daily, Pooja Bravo W, APRN, 50 mg at 03/22/22 0819  •  nitroglycerin (NITROSTAT) SL tablet 0.4 mg, 0.4 mg, Sublingual, Q5 Min PRN, Pooja Bravo W, APRN  •  sertraline (ZOLOFT) tablet 50 mg, 50 mg, Oral, Daily, Manpreet Bravoa W, APRN, 50 mg at 03/22/22 0819  •  sodium chloride 0.9 % flush 10 mL, 10 mL, Intravenous, PRN, Elvin Lujan MD  •  sodium chloride 0.9 % flush 10 mL, 10 mL, Intravenous, Q12H, Pooja Bravo W, APRN, 10 mL at 03/22/22 0820  •  sodium chloride 0.9 % flush 10 mL, 10 mL, Intravenous, PRN, Pooja Bravo W, APRN    Physical Exam: General well-developed well-nourished male not dyspneic not tachypneic current heart rate 58 with a normal blood pressure        HEENT: No JVP or bruits.  Tongue midline.  Dentition good.  No icterus/EOMI       Respiratory: Equal bilateral symmetrical expansion\" bilaterally       Cardiovascular: Regular rate and rhythm without murmur gallop or click and no edema to palpation       GI: Soft flat nontender positive bowel sounds       Lower Extremities: No lesions       Neuro: Facial expressions are symmetrical.  Handgrip strength equal bilaterally 5/5       Skin: Warm and dry no edema to palpation       Psych: Pleasant affect oriented x3    Results Review: Peak troponin is 0.223.  Heart rates now 57-70.  TSH is elevated at 9.1    Radiology Results:  Imaging Results (Last 72 Hours)     Procedure Component Value Units Date/Time    XR Chest 1 View [[id number]] Collected: 03/21/22 1724     Updated: 03/21/22 1727    Narrative:      XR CHEST 1 VW-     Date of Exam: 3/21/2022 5:11 PM     Indication: Chest Pain triage " protocol.     Comparison Exams: None available.     Technique: Single AP chest radiograph     FINDINGS:  The lungs are clear. The heart and mediastinal contours appear normal.  The pulmonary vasculature appears normal. The osseous structures appear  intact.       Impression:      No acute cardiopulmonary process identified.     This report was finalized on 3/21/2022 5:24 PM by Daniel Patel MD.             EKG: First EKG shows A. fib RVR diffuse ST segment depression.  Follow-up EKG shows sinus rhythm no ischemia    ECHO: Pending    Tele: Sinus rhythm    Assessment   Assessment/Plan: 1 patient presents with sudden onset of palpitations with weakness and diaphoresis.  It persisted quite a while so the patient was brought to the ER he underwent cardioversion.  This occurred despite being on metoprolol.  He has had multiple episodes of palpitations since January.  When he is not having arrhythmia he denies any tightness or heaviness in his chest or dyspnea.  The arrhythmia is quite fast and the rhythm is irregular.  This looks more like an SVT.  I will have the EP reviewed the EKG.  We will plan for stress PET today and an echocardiogram.  Whether this is A. fib flutter or SVT if the scans are negative we will start him on flecainide.  Since the patient's arrhythmias begin to occur after Covid may be worthwhile to get a cardiac MRI to look for myocardial or pericardial inflammation  The patient's cardiac enzymes are mildly elevated.  This could be due to the rapid rate with his SVT and following cardioversion.  When he has not an SVT he has no exertional angina or dyspnea.  We will perform stress PET to rule out ischemic heart disease    Adolph Perez MD  03/22/22  09:32 EDT   This is an addendum to the earlier note.  The patient's nuclear stress test was normal and EF is normal.  Her EP reviewed the EKG and confirmed this is SVT and not A. fib.  We will start the patient on flecainide.  His QTC is normal.   The patient can be discharged home and follow-up with Dr. Landeros in 6 weeks to discuss ablation  He should return for significant SVT      Electronically signed by Adolph Perez MD at 22 1549     Mary Kate Feng RN at 22 0541        Goal Outcome Evaluation:     VSS. NSR confirmed by this AM EKG. RA. Wife at bedside. NPO since MN. Dr. Coley consult this AM.              Electronically signed by Mary Kate Feng RN at 22 0544     Hever Reilly III, DO at 22 1918              [facility] Medicine Services  HISTORY AND PHYSICAL    Patient Name: Asim Priest  : 1965  MRN: [id number]  Primary Care Physician: Breezy Horn NP-C  Date of admission: 3/21/2022    Subjective   Subjective     Chief Complaint:  Palpitations     HPI:  Asim Priest is a 56 y.o. male with history of A. fib, hypertension, hyperlipidemia, Covid infection 2022, anxiety, presents to the ED with complaints of palpitations.  Patient has been wearing a heart monitor for the past week.  He was told if he became symptomatic to push the button on his monitor, and to go to the ED for evaluation.  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  He has been having similar symptoms recently but never this severe.  He denies chest pain, edema, nausea, vomiting, abdominal pain, syncope, or any other complaints at this time.  He has had a lingering cough since having Covid in January.  Patient is supposed to be scheduled for a stress test in the near future with Dr. Coley.  Upon arrival to the ED he was found to have a heart rate of 198 and EKG showed afib.  Patient underwent a successful cardioversion in the ED.  Pertinent labs include troponin of 0.053 and 0.121.  He was given aspirin, Lopressor, and Lovenox 1 mg/kg in the ED.  Patient is being admitted to the  Hospitalist for further evaluation and management.    COVID Details:        Symptoms: [] NONE [] Fever [x]  Cough [x] Shortness of breath [] Change in taste or smell  The patient qualifies to receive the vaccine, but they have not yet received it.    Review of Systems   Constitutional: Positive for diaphoresis. Negative for appetite change, chills, fatigue and fever.   Eyes: Negative.    Respiratory: Positive for cough and shortness of breath. Negative for wheezing.    Cardiovascular: Positive for palpitations. Negative for chest pain and leg swelling.   Gastrointestinal: Negative.    Endocrine: Negative.    Genitourinary: Negative.    Musculoskeletal: Negative.    Skin: Negative.    Allergic/Immunologic: Negative.    Neurological: Positive for dizziness, light-headedness and headaches.   Hematological: Negative.    Psychiatric/Behavioral: Negative.         All other systems reviewed and are negative.     Personal History     Past Medical History:   Diagnosis Date   • Anxiety    • Atrial fibrillation (HCC) 2007   • Dizziness    • Hyperlipidemia    • Hypertension    • Insomnia    • Mitral valve prolapse 2000   • Palpitations    • Precordial pain    • Sleep apnea        Past Surgical History:   Procedure Laterality Date   • CARDIAC CATHETERIZATION  2018, 1997    negative findings   • KNEE SURGERY     • SHOULDER SURGERY         Family History:  family history includes Arrhythmia in his mother; Heart attack in his father, maternal grandfather, and paternal grandfather; Heart disease in his father. Otherwise pertinent FHx was reviewed and unremarkable.     Social History:  reports that he has never smoked. He has never used smokeless tobacco. He reports current alcohol use. He reports that he does not use drugs.  Social History     Social History Narrative   • Not on file       Medications:  Zolpidem Tartrate, aspirin, metoprolol succinate XL, nitroglycerin, and sertraline    Allergies   Allergen Reactions   •  Penicillins Other (See Comments)     Reaction unknown, was advised by family        Objective   Objective     Vital Signs:   Temp:  [98 °F (36.7 °C)] 98 °F (36.7 °C)  Heart Rate:  [] 75  Resp:  [18] 18  BP: (111-162)/() 128/89    Physical Exam   Constitutional: Awake, alert, sitting up in bed, family at bedside  Eyes: PERRLA, sclerae anicteric, no conjunctival injection  HENT: NCAT, mucous membranes moist  Neck: Supple, no thyromegaly, no lymphadenopathy, trachea midline  Respiratory: Clear to auscultation bilaterally, nonlabored respirations   Cardiovascular: RRR, no murmurs, rubs, or gallops, palpable pedal pulses bilaterally  Gastrointestinal: Positive bowel sounds, soft, nontender, nondistended  Musculoskeletal: No bilateral ankle edema, no clubbing or cyanosis to extremities  Psychiatric: Appropriate affect, cooperative  Neurologic: Oriented x 3, strength symmetric in all extremities, Cranial Nerves grossly intact to confrontation, speech clear  Skin: No rashes       Result Review:  I have personally reviewed the results from the time of this admission to 03/21/22 7:18 PM EDT and agree with these findings:  [x]  Laboratory  []  Microbiology  [x]  Radiology  [x]  EKG/Telemetry   []  Cardiology/Vascular   []  Pathology  [x]  Old records  []  Other:  Most notable findings include:       LAB RESULTS:      Lab 03/21/22  1751   WBC 9.49   HEMOGLOBIN 15.0   HEMATOCRIT 43.1   PLATELETS 166   NEUTROS ABS 7.39*   IMMATURE GRANS (ABS) 0.03   LYMPHS ABS 1.45   MONOS ABS 0.48   EOS ABS 0.09   MCV 87.6         Lab 03/21/22  1635   SODIUM 136   POTASSIUM 4.1   CHLORIDE 102   CO2 22.0   ANION GAP 12.0   BUN 13   CREATININE 1.00   EGFR 87.8   GLUCOSE 109*   CALCIUM 9.5         Lab 03/21/22  1635   TOTAL PROTEIN 7.3   ALBUMIN 4.80   GLOBULIN 2.5   ALT (SGPT) 31   AST (SGOT) 24   BILIRUBIN 0.5   ALK PHOS 81   LIPASE 27         Lab 03/21/22  1751 03/21/22  1635   PROBNP  --  132.8   TROPONIN T 0.121* 0.053*                    Microbiology Results (last 10 days)     ** No results found for the last 240 hours. **          XR Chest 1 View    Result Date: 3/21/2022  XR CHEST 1 VW-  Date of Exam: 3/21/2022 5:11 PM  Indication: Chest Pain triage protocol.  Comparison Exams: None available.  Technique: Single AP chest radiograph  FINDINGS: The lungs are clear. The heart and mediastinal contours appear normal. The pulmonary vasculature appears normal. The osseous structures appear intact.      Impression: No acute cardiopulmonary process identified.  This report was finalized on 3/21/2022 5:24 PM by Daniel Patel MD.        Results for orders placed during the hospital encounter of 03/03/20    Adult Stress Echo W/ Cont or Stress Agent if Necessary Per Protocol    Interpretation Summary  · Resting Echocardiogram Findings:  · Normal left ventricular cavity size and wall thickness noted. All left ventricular wall segments contract normally.  · Estimated EF appears to be in the range of 61 - 65%.  · The valve appears trileaflet. The aortic valve is abnormal in structure. The valve exhibits sclerosis. No aortic valve regurgitation is present. No aortic valve stenosis is present  · The mitral valve is normal in structure. No mitral valve regurgitation is present. No significant mitral valve stenosis is present.  · Tricuspid valve not well visualized. The tricuspid valve is grossly normal. No tricuspid valve regurgitation is present.  · There is no evidence of pericardial effusion.  · Stress Procedure:  · A stress test was performed following the [facility] protocol.  · Exercise duration (min) 7 min Exercise duration (sec) 30 sec Estimated workload 10.1 METS  · Baseline Vitals Baseline HR 83 bpm Baseline /98 mmHg Peak Stress Vitals Peak  bpm Peak /92 mmHg Recovery Vitals Recovery HR 82 bpm Recovery /102 mmHg Exercise Data Target HR (85%) 140 bpm Max. Pred. HR (100%) 165 bpm Percent Max Pred HR 87.27 %  · A horizontal ST  segment depression of 1 mm in the inferolateral leads was noted during stress (II, III, aVF, V5, V6, V4 and V3), and returning to baseline after less than 1 minute of recovery.  · There were no arrhythmias during stress.  · Abnormal with interpretable ST segment stress ECG interpretation.  · Stress Echo Findings:  · Segment augmentation had a normal response to stress  · Cavity size behaved normally in response to stress. Left ventricular function is normal. Septal wall motion is normal  · Normal stress echo with no significant echocardiographic evidence for myocardial ischemia.      Assessment/Plan   Assessment & Plan       SVT (supraventricular tachycardia) ([facility])    Elevated troponin    A-fib (HCC)    HTN (hypertension)    HLD (hyperlipidemia)    RACHEL (obstructive sleep apnea)    Hypoxia    Paroxysmal atrial fibrillation with rapid ventricular response (HCC)    Asim Priest is a 56 y.o. male with history of A. fib, hypertension, hyperlipidemia, Covid infection 1/2022, anxiety, presents to the ED with complaints of palpitations.       Assessment and Plan:    A. fib with RVR versus SVT  Elevated troponin  Hypoxia  -- troponin of 0.053 and 0.121   --Given aspirin and Lovenox 1 mg/kg in the ED   --Patient has a history of an isolated episode of A. fib in 2007  --has been wearing a heart monitor for the past week  --Follows with Dr. Coley--will consult in the am  --Continue aspirin and metoprolol  --continuous pulse ox with supplemental oxygen as needed  --npo after midnight  --trend troponin  --EKG in the am  --echo in the am  --am labs    HTN  HLD  --metoprolol  --am labs        DVT prophylaxis: Was given Lovenox 1 mg/kg in the ED    CODE STATUS:    Level Of Support Discussed With: Patient  Code Status (Patient has no pulse and is not breathing): CPR (Attempt to Resuscitate)  Medical Interventions (Patient has pulse or is breathing): Full Support      This note has been completed as part of a split-shared  workflow.   Signature: Electronically signed by INDY James, 03/21/22, 8:30 PM EDT.       Attending   Admission Attestation       I have seen and examined the patient, performing an independent face-to-face diagnostic evaluation with plan of care reviewed and developed with the advanced practice clinician (APC).      Brief Summary Statement:   Asim Priest is a 56 y.o. male who states he had palpitations, shortness of breath, dizziness, and diaphoresis that started in the late afternoon while he was at work. He has actually been wearing a cardiac monitor for the last week as prescribed by a cardiologist, and when he had the above symptoms today he activated the device to being recording and then came to the ED. He denies any chest pain. On Ed arrival he had a rate around 200 and began to c/o chest tightness, so he was elecrtically cardioverted in the ED. He returned to normal sinus rhythm and his symptoms resolved. I reviewed the telemetry strip from ED arrival and his EKG which showed SVT/a-fib. Subsequent EKG showed sinus tach and he eventually moved to a normal rate and rhythm by the time I visited him in his ED room with his family.    Remainder of detailed HPI is as noted by APC and has been reviewed and/or edited by me for completeness.    Attending Physical Exam:  Constitutional: Awake, alert  Eyes: PERRLA, sclerae anicteric, no conjunctival injection  HENT: NCAT, mucous membranes moist  Neck: Supple, no thyromegaly, no lymphadenopathy, trachea midline  Respiratory: Clear to auscultation bilaterally, nonlabored respirations   Cardiovascular: RRR, no murmurs, rubs, or gallops, palpable pedal pulses bilaterally  Gastrointestinal: Positive bowel sounds, soft, nontender, nondistended  Musculoskeletal: No bilateral ankle edema, no clubbing or cyanosis to extremities  Psychiatric: Appropriate affect, cooperative  Neurologic: Oriented x 3, strength symmetric in all extremities, Cranial  Nerves grossly intact to confrontation, speech clear  Skin: No rashes, normal turgor    Brief Assessment/Plan :  See detailed assessment and plan developed with APC which I have reviewed and/or edited for completeness.    Patient also confirms daily beer intake and patient and family state possible need for CIWA protocol, which I have added to the orders.    Admission Status: I believe that this patient meets inpatient criteria due to SVT which required cardioversion, as well as need for cardiology consult.      Hever Reilly,III, DO  03/21/22                        Electronically signed by Hever Reilly III, DO at 03/22/22 0111     Elvin Lujan MD at 03/21/22 1620      Procedure Orders    1. Procedural Sedation [[id number]] ordered by Elvin Lujan MD    2. Electrical Cardioversion [[id number]] ordered by Elvin Lujan MD    3. Critical Care [[id number]] ordered by Elvin Lujan MD               Subjective   The patient presents to the emergency department with chest pain/tightness, palpitations, diaphoresis, weakness, and near syncope.  Patient has a history of paroxysmal atrial fibrillation.  Patient is currently wearing a monitor and he hit the button and they notified him that he was in A. fib and come to the ER.  Patient is currently on aspirin.  He reports the symptoms started about 2 hours ago.  They were sudden onset and he can tell exactly when it started.  He was working on a forklift and nearly passed out.  Patient arrives significantly symptomatic with diaphoresis, chest tightness, and generalized weakness.      History provided by:  Patient and relative      Review of Systems   Constitutional: Positive for fatigue.   Respiratory: Positive for chest tightness and shortness of breath.    Cardiovascular: Positive for palpitations.   Neurological: Positive for weakness.       Past Medical History:   Diagnosis Date   • Anxiety    • Atrial fibrillation (HCC)  2007   • Dizziness    • Hyperlipidemia    • Hypertension    • Insomnia    • Mitral valve prolapse 2000   • Palpitations    • Precordial pain    • Sleep apnea        Allergies   Allergen Reactions   • Penicillins Other (See Comments)     Reaction unknown, was advised by family        Past Surgical History:   Procedure Laterality Date   • CARDIAC CATHETERIZATION  2018, 1997    negative findings   • KNEE SURGERY     • SHOULDER SURGERY         Family History   Problem Relation Age of Onset   • Arrhythmia Mother    • Heart disease Father    • Heart attack Father    • Heart attack Maternal Grandfather    • Heart attack Paternal Grandfather        Social History     Socioeconomic History   • Marital status:    Tobacco Use   • Smoking status: Never Smoker   • Smokeless tobacco: Never Used   Substance and Sexual Activity   • Alcohol use: Yes     Comment: social   • Drug use: Never           Objective   Physical Exam  Vitals and nursing note reviewed.   Constitutional:       General: He is in acute distress.      Appearance: He is obese. He is ill-appearing and diaphoretic.   HENT:      Head: Normocephalic and atraumatic.   Cardiovascular:      Rate and Rhythm: Tachycardia present.      Pulses:           Radial pulses are 1+ on the right side and 1+ on the left side.   Pulmonary:      Effort: Tachypnea present.   Abdominal:      Palpations: Abdomen is soft.      Tenderness: There is no abdominal tenderness.   Musculoskeletal:      Right lower leg: No edema.      Left lower leg: No edema.   Skin:     Comments: Skin is quite diaphoretic and pale.   Neurological:      Mental Status: He is alert and oriented to person, place, and time.         Electrical Cardioversion    Date/Time: 3/21/2022 4:33 PM  Performed by: Elvin Lujan MD  Authorized by: Elvin Lujan MD     Consent:     Consent obtained:  Emergent situation and verbal    Consent given by:  Patient    Risks, benefits, and alternatives were  discussed: yes      Risks discussed:  Cutaneous burn, death, induced arrhythmia and pain  Universal protocol:     Procedure explained and questions answered to patient or proxy's satisfaction: yes      Immediately prior to procedure, a time out was called: yes      Patient identity confirmed:  Verbally with patient and arm band  Pre-procedure details:     Cardioversion basis:  Emergent    Rhythm:  Atrial fibrillation    Electrode placement:  Anterior-posterior  Attempt one:     Cardioversion mode:  Synchronous    Waveform:  Biphasic    Shock (Joules):  150    Shock outcome:  Conversion to normal sinus rhythm  Post-procedure details:     Patient status:  Awake    Procedure completion:  Tolerated well, no immediate complications  Procedural Sedation    Date/Time: 3/21/2022 4:34 PM  Performed by: Elvin Lujan MD  Authorized by: Elvin Lujan MD     Consent:     Consent obtained:  Emergent situation    Consent given by:  Patient    Risks, benefits, and alternatives were discussed: yes      Risks discussed:  Allergic reaction, dysrhythmia, inadequate sedation, nausea, vomiting, prolonged hypoxia resulting in organ damage and respiratory compromise necessitating ventilatory assistance and intubation  Universal protocol:     Procedure explained and questions answered to patient or proxy's satisfaction: yes      Immediately prior to procedure, a time out was called: yes      Patient identity confirmed:  Verbally with patient and arm band  Indications:     Procedure performed:  Cardioversion    Procedure necessitating sedation performed by:  Physician performing sedation    Intended level of sedation:  Deep  Pre-sedation assessment:     Time since last food or drink:  4hrs    NPO status caution: unable to specify NPO status      ASA classification: class 2 - patient with mild systemic disease      Mouth opening:  3 or more finger widths    Thyromental distance:  4 finger widths    Mallampati score:  I - soft  palate, uvula, fauces, pillars visible    Neck mobility: normal      Pre-sedation assessments completed and reviewed: airway patency, anesthesia/sedation history, cardiovascular function, hydration status, mental status, nausea/vomiting, pain level and respiratory function      History of difficult intubation: no    Immediate pre-procedure details:     Verified: bag valve mask available, emergency equipment available, IV patency confirmed, oxygen available and suction available    Procedure details (see MAR for exact dosages):     Sedation start time:  3/21/2022 4:20 PM    Preoxygenation:  Nasal cannula    Sedation:  Methohexital    Intra-procedure monitoring:  Blood pressure monitoring, continuous pulse oximetry, cardiac monitor, frequent LOC assessments and frequent vital sign checks    Intra-procedure events comment:  Borderline hypoxemia(88%)    Intra-procedure management:  Airway repositioning and supplemental oxygen    Sedation end time:  3/21/2022 4:30 PM    Total sedation time (minutes):  10  Post-procedure details:     Post-sedation assessment completed:  3/21/2022 4:36 PM    Attendance: Constant attendance by certified staff until patient recovered      Recovery: Patient returned to pre-procedure baseline      Procedure completion:  Tolerated well, no immediate complications  Critical Care  Performed by: Elvin Lujan MD  Authorized by: Elvin Lujan MD     Critical care provider statement:     Critical care time (minutes):  30    Critical care was necessary to treat or prevent imminent or life-threatening deterioration of the following conditions:  Cardiac failure and circulatory failure    Critical care was time spent personally by me on the following activities:  Discussions with primary provider, discussions with consultants, evaluation of patient's response to treatment, examination of patient, interpretation of cardiac output measurements, obtaining history from patient or surrogate,  ordering and performing treatments and interventions, ordering and review of laboratory studies, ordering and review of radiographic studies, re-evaluation of patient's condition and pulse oximetry    Care discussed with: admitting provider                 ED Course  ED Course as of 03/21/22 2235   Mon Mar 21, 2022   1612 Heart Rate(!): 180 [RS]   1646 Heart Rate: 86 [RS]   1646 Successful emergent cardioversion secondary to significantly symptomatic and unstable rapid atrial fibrillation.  See documentation of procedures for details. [RS]   1714 Troponin T(!!): 0.053 [RS]   1714 proBNP: 132.8 [RS]   1735 XR Chest 1 View  Personally reviewed the 1 view chest demonstrating no focal infiltrate or emergent finding.  See report from radiology for details. [RS]   1747 Cardiology paged. [RS]   1832 Troponin T(!!): 0.121 [RS]   1832 Cardiology paged. [RS]   1837 Discussed the case in detail with Dr. Najera with cardiology.  We reviewed the patient's history, presenting symptoms, findings, labs, EKGs, and findings.  He advised that the patient does not need to be admitted.  He recommends discharge and he will facilitate expedited work-up for further management.  He does not recommend anticoagulation at this time secondary to a low [id number] score.  He does want us to increase the metoprolol to 100 mg XL daily.  I have messaged Dr. Najera the details for the expedited follow-up arrangements. [RS]   1903 I talked with the patient and family, the patient does report continued to have some occasional chest tightness and lightheadedness without any arrhythmia changes.  Is also reports and family agree that they are not currently comfortable with him going home with the presenting symptoms.  I feel this is very reasonable and am supportive of admission for further evaluation and management.  Hospitalist paged. [RS]   1915 Case discussed with Dr. Reilly who is agreeable with plan for admission. [RS]      ED Course User  Index  [RS] Elvin Lujan MD                                               [id number] Score (for atrial fibrillation stroke risk) reviewed and/or performed as part of the patient evaluation and treatment planning process.  The result associated with this review/performance is: 1       MDM  Number of Diagnoses or Management Options  Chest pain, unspecified type  Elevated troponin  History of hyperlipidemia  History of hypertension  Near syncope  Paroxysmal atrial fibrillation with rapid ventricular response (HCC)  Diagnosis management comments: Recent Results (from the past 24 hour(s))  -ECG 12 Lead:   Collection Time: 03/21/22  4:21 PM       Result                      Value             Ref Range           QT Interval                 238               ms                  QTC Interval                431               ms             -ECG 12 Lead:   Collection Time: 03/21/22  4:29 PM       Result                      Value             Ref Range           QT Interval                 348               ms                  QTC Interval                462               ms             -Troponin:   Collection Time: 03/21/22  4:35 PM  Specimen: Blood       Result                      Value             Ref Range           Troponin T                  0.053 (C)         0.000 - 0.03*  -Comprehensive Metabolic Panel:   Collection Time: 03/21/22  4:35 PM  Specimen: Blood       Result                      Value             Ref Range           Glucose                     109 (H)           65 - 99 mg/dL       BUN                         13                6 - 20 mg/dL        Creatinine                  1.00              0.76 - 1.27 *       Sodium                      136               136 - 145 mm*       Potassium                   4.1               3.5 - 5.2 mm*       Chloride                    102               98 - 107 mmo*       CO2                         22.0              22.0 - 29.0 *       Calcium                      9.5               8.6 - 10.5 m*       Total Protein               7.3               6.0 - 8.5 g/*       Albumin                     4.80              3.50 - 5.20 *       ALT (SGPT)                  31                1 - 41 U/L          AST (SGOT)                  24                1 - 40 U/L          Alkaline Phosphatase        81                39 - 117 U/L        Total Bilirubin             0.5               0.0 - 1.2 mg*       Globulin                    2.5               gm/dL               A/G Ratio                   1.9               g/dL                BUN/Creatinine Ratio        13.0              7.0 - 25.0          Anion Gap                   12.0              5.0 - 15.0 m*       eGFR                        87.8              >60.0 mL/min*  -Lipase:   Collection Time: 03/21/22  4:35 PM  Specimen: Blood       Result                      Value             Ref Range           Lipase                      27                13 - 60 U/L    -BNP:   Collection Time: 03/21/22  4:35 PM  Specimen: Blood       Result                      Value             Ref Range           proBNP                      132.8             0.0 - 900.0 *  -Green Top (Gel):   Collection Time: 03/21/22  4:35 PM       Result                      Value             Ref Range           Extra Tube                                                    Hold for add-ons.  -Gold Top - SST:   Collection Time: 03/21/22  4:35 PM       Result                      Value             Ref Range           Extra Tube                                                    Hold for add-ons.  -Gray Top:   Collection Time: 03/21/22  4:35 PM       Result                      Value             Ref Range           Extra Tube                                                    Hold for add-ons.  -Light Blue Top:   Collection Time: 03/21/22  4:35 PM       Result                      Value             Ref Range           Extra Tube                                                     hold for add-on  -Troponin:   Collection Time: 03/21/22  5:51 PM  Specimen: Blood       Result                      Value             Ref Range           Troponin T                  0.121 (C)         0.000 - 0.03*  -Lavender Top:   Collection Time: 03/21/22  5:51 PM       Result                      Value             Ref Range           Extra Tube                                                    hold for add-on  -CBC Auto Differential:   Collection Time: 03/21/22  5:51 PM  Specimen: Blood       Result                      Value             Ref Range           WBC                         9.49              3.40 - 10.80*       RBC                         4.92              4.14 - 5.80 *       Hemoglobin                  15.0              13.0 - 17.7 *       Hematocrit                  43.1              37.5 - 51.0 %       MCV                         87.6              79.0 - 97.0 *       MCH                         30.5              26.6 - 33.0 *       MCHC                        34.8              31.5 - 35.7 *       RDW                         13.2              12.3 - 15.4 %       RDW-SD                      41.8              37.0 - 54.0 *       MPV                         9.4               6.0 - 12.0 fL       Platelets                   166               140 - 450 10*       Neutrophil %                77.9 (H)          42.7 - 76.0 %       Lymphocyte %                15.3 (L)          19.6 - 45.3 %       Monocyte %                  5.1               5.0 - 12.0 %        Eosinophil %                0.9               0.3 - 6.2 %         Basophil %                  0.5               0.0 - 1.5 %         Immature Grans %            0.3               0.0 - 0.5 %         Neutrophils, Absolute       7.39 (H)          1.70 - 7.00 *       Lymphocytes, Absolute       1.45              0.70 - 3.10 *       Monocytes, Absolute         0.48              0.10 - 0.90 *       Eosinophils, Absolute       0.09              0.00 - 0.40  *       Basophils, Absolute         0.05              0.00 - 0.20 *       Immature Grans, Absolu*     0.03              0.00 - 0.05 *       nRBC                        0.0               0.0 - 0.2 /1*  -COVID-19 and FLU A/B PCR - Swab, Nasopharynx:   Collection Time: 03/21/22  8:47 PM  Specimen: Nasopharynx; Swab       Result                      Value             Ref Range           COVID19                     Not Detected      Not Detected*       Influenza A PCR             Not Detected      Not Detected        Influenza B PCR             Not Detected      Not Detected   Note: In addition to lab results from this visit, the labs listed above may include labs taken at another facility or during a different encounter within the last 24 hours. Please correlate lab times with ED admission and discharge times for further clarification of the services performed during this visit.    XR Chest 1 View   Final Result    No acute cardiopulmonary process identified.         This report was finalized on 3/21/2022 5:24 PM by Daniel Patel MD.        Medications  sodium chloride 0.9 % flush 10 mL (has no administration in time range)  methohexital (BREVITAL) injection  - ADS Override Pull (has no administration in time range)  aspirin chewable tablet 324 mg (324 mg Oral Given 3/21/22 1933)  methohexital (BREVITAL) injection  - ADS Override Pull (100 mg  Given by Other 3/21/22 1636)  methohexital (BREVITAL SODIUM) injection (20 mg Intravenous Given 3/21/22 1626)  metoprolol tartrate (LOPRESSOR) injection 5 mg (5 mg Intravenous Given 3/21/22 1633)  metoprolol tartrate (LOPRESSOR) tablet 25 mg (25 mg Oral Given 3/21/22 1933)   enoxaparin (LOVENOX) syringe 110 mg (110 mg Subcutaneous Given 3/21/22 1933)  ECG/EMG Results (last 24 hours)     Procedure Component Value Units Date/Time    ECG 12 Lead ([id number]) Collected: 03/21/22 1629     Updated: 03/21/22 1703     QT Interval 348 ms      QTC Interval 462 ms     Narrative:      Test  Reason : chest pain  Blood Pressure :   */*   mmHG  Vent. Rate : 106 BPM     Atrial Rate : 106 BPM     P-R Int : 164 ms          QRS Dur :  90 ms      QT Int : 348 ms       P-R-T Axes :  33  19  24 degrees     QTc Int : 462 ms    Sinus tachycardia  Nonspecific ST abnormality  Abnormal ECG  When compared with ECG of 21-MAR-2022 16:21, (Unconfirmed)  Vent. rate has decreased BY  92 BPM  T wave inversion less evident in Inferior leads  T wave inversion no longer evident in Lateral leads  Confirmed by JAYLENE SALAZAR MD ([id number]) on 3/21/2022 5:02:59 PM    Referred By: EDMD           Confirmed By: JAYLENE SALAZAR MD    ECG 12 Lead ([id number]) Collected: 03/21/22 1621     Updated: 03/21/22 1703     QT Interval 238 ms      QTC Interval 431 ms     Narrative:      Test Reason : chest pain  Blood Pressure :   */*   mmHG  Vent. Rate : 198 BPM     Atrial Rate : 208 BPM     P-R Int :   * ms          QRS Dur :  92 ms      QT Int : 238 ms       P-R-T Axes :   *  27 -58 degrees     QTc Int : 431 ms    SVT versus rapid Afib  Marked ST abnormality, possible inferolateral subendocardial injury  Abnormal ECG  No previous ECGs available  Confirmed by JAYLENE SALAZAR MD ([id number]) on 3/21/2022 5:03:18 PM    Referred By: EDMD           Confirmed By: JAYLENE SALAZAR MD      ECG 12 Lead   Final Result    Test Reason : chest pain    Blood Pressure :   */*   mmHG    Vent. Rate : 106 BPM     Atrial Rate : 106 BPM       P-R Int : 164 ms          QRS Dur :  90 ms        QT Int : 348 ms       P-R-T Axes :  33  19  24 degrees       QTc Int : 462 ms        Sinus tachycardia    Nonspecific ST abnormality    Abnormal ECG    When compared with ECG of 21-MAR-2022 16:21, (Unconfirmed)    Vent. rate has decreased BY  92 BPM    T wave inversion less evident in Inferior leads    T wave inversion no longer evident in Lateral leads    Confirmed by JAYLENE SALAZAR MD ([id number]) on 3/21/2022 5:02:59 PM        Referred By: EDMD           Confirmed By: JAYLENE SALAZAR MD     ECG 12  Lead   Final Result    Test Reason : chest pain    Blood Pressure :   */*   mmHG    Vent. Rate : 198 BPM     Atrial Rate : 208 BPM       P-R Int :   * ms          QRS Dur :  92 ms        QT Int : 238 ms       P-R-T Axes :   *  27 -58 degrees       QTc Int : 431 ms        SVT versus rapid Afib    Marked ST abnormality, possible inferolateral subendocardial injury    Abnormal ECG    No previous ECGs available    Confirmed by ELVIN LUJAN MD ([id number]) on 3/21/2022 5:03:18 PM        Referred By: EDMD           Confirmed By: ELVIN LUJAN MD            Amount and/or Complexity of Data Reviewed  Clinical lab tests: reviewed  Tests in the radiology section of CPT®: reviewed  Decide to obtain previous medical records or to obtain history from someone other than the patient: yes  Obtain history from someone other than the patient: yes  Discuss the patient with other providers: yes  Independent visualization of images, tracings, or specimens: yes    Critical Care  Total time providing critical care: 30-74 minutes      Final diagnoses:   Paroxysmal atrial fibrillation with rapid ventricular response (HCC)   Near syncope   Chest pain, unspecified type   Elevated troponin   History of hypertension   History of hyperlipidemia       ED Disposition  ED Disposition     ED Disposition   Decision to Admit    Condition   --    Comment   Level of Care: Telemetry [5]   Diagnosis: Paroxysmal atrial fibrillation with rapid ventricular response (HCC) [[id number]]   Admitting Physician: WINSTON MÉNDEZ III [[id number]]   Attending Physician: WINSTON MÉNDEZ III [[id number]]   Isolate for COVID?: No [0]   Bed Request Comments: inpt tele   Certification: I Certify That Inpatient Hospital Services Are Medically Necessary For Greater Than 2 Midnights               No follow-up provider specified.       Medication List      No changes were made to your prescriptions during this visit.          Elvin Lujan MD  03/21/22  2235      Electronically signed by Elvin Lujan MD at 03/21/22 2235              Lab Results (last 24 hours)     Procedure Component Value Units Date/Time    T4, Free [[id number]]  (Normal) Collected: 03/22/22 0300    Specimen: Blood Updated: 03/22/22 1013     Free T4 1.03 ng/dL     Narrative:      Results may be falsely increased if patient taking Biotin.      Protime-INR [[id number]]  (Normal) Collected: 03/22/22 0300    Specimen: Blood Updated: 03/22/22 0424     Protime 14.2 Seconds      INR 1.13    TSH [[id number]]  (Abnormal) Collected: 03/22/22 0300    Specimen: Blood Updated: 03/22/22 0422     TSH 9.730 uIU/mL     Narrative:      Due to abnormal TSH results, suggest ordering Free T4.    Basic Metabolic Panel [[id number]]  (Abnormal) Collected: 03/22/22 0300    Specimen: Blood Updated: 03/22/22 0416     Glucose 89 mg/dL      BUN 11 mg/dL      Creatinine 0.97 mg/dL      Sodium 140 mmol/L      Potassium 3.9 mmol/L      Comment: Slight hemolysis detected by analyzer. Results may be affected.        Chloride 108 mmol/L      CO2 22.0 mmol/L      Calcium 8.6 mg/dL      BUN/Creatinine Ratio 11.3     Anion Gap 10.0 mmol/L      eGFR 91.1 mL/min/1.73      Comment: National Kidney Foundation and American Society of Nephrology (ASN) Task Force recommended calculation based on the Chronic Kidney Disease Epidemiology Collaboration (CKD-EPI) equation refit without adjustment for race.       Narrative:      GFR Normal >60  Chronic Kidney Disease <60  Kidney Failure <15      Lipid Panel [[id number]]  (Abnormal) Collected: 03/22/22 0300    Specimen: Blood Updated: 03/22/22 0416     Total Cholesterol 166 mg/dL      Triglycerides 310 mg/dL      HDL Cholesterol 32 mg/dL      LDL Cholesterol  83 mg/dL      VLDL Cholesterol 51 mg/dL      LDL/HDL Ratio 2.25    Narrative:      Cholesterol Reference Ranges  (U.S. Department of Health and Human Services ATP III Classifications)    Desirable          <200 mg/dL  Borderline High     200-239 mg/dL  High Risk          >240 mg/dL      Triglyceride Reference Ranges  (U.S. Department of Health and Human Services ATP III Classifications)    Normal           <150 mg/dL  Borderline High  150-199 mg/dL  High             200-499 mg/dL  Very High        >500 mg/dL    HDL Reference Ranges  (U.S. Department of Health and Human Services ATP III Classifications)    Low     <40 mg/dl (major risk factor for CHD)  High    >60 mg/dl ('negative' risk factor for CHD)        LDL Reference Ranges  (U.S. Department of Health and Human Services ATP III Classifications)    Optimal          <100 mg/dL  Near Optimal     100-129 mg/dL  Borderline High  130-159 mg/dL  High             160-189 mg/dL  Very High        >189 mg/dL    Magnesium [[id number]]  (Normal) Collected: 03/22/22 0300    Specimen: Blood Updated: 03/22/22 0416     Magnesium 2.1 mg/dL     CBC (No Diff) [[id number]]  (Normal) Collected: 03/22/22 0300    Specimen: Blood Updated: 03/22/22 0357     WBC 7.21 10*3/mm3      RBC 4.76 10*6/mm3      Hemoglobin 14.4 g/dL      Hematocrit 42.0 %      MCV 88.2 fL      MCH 30.3 pg      MCHC 34.3 g/dL      RDW 13.2 %      RDW-SD 42.6 fl      MPV 9.5 fL      Platelets 168 10*3/mm3     Troponin [[id number]]  (Abnormal) Collected: 03/21/22 2340    Specimen: Blood Updated: 03/22/22 0047     Troponin T 0.223 ng/mL     Narrative:      Troponin T Reference Range:  <= 0.03 ng/mL-   Negative for AMI  >0.03 ng/mL-     Abnormal for myocardial necrosis.  Clinicians would have to utilize clinical acumen, EKG, Troponin and serial changes to determine if it is an Acute Myocardial Infarction or myocardial injury due to an underlying chronic condition.       Results may be falsely decreased if patient taking Biotin.      COVID-19 and FLU A/B PCR - Swab, Nasopharynx [[id number]]  (Normal) Collected: 03/21/22 2047    Specimen: Swab from Nasopharynx Updated: 03/21/22 2130     COVID19 Not Detected     Influenza A PCR Not Detected     Influenza B  PCR Not Detected    Narrative:      Fact sheet for providers: https://www.fda.gov/media/692524/download    Fact sheet for patients: https://www.fda.gov/media/199951/download    Test performed by PCR.    [place] Draw [[id number]] Collected: 03/21/22 1635    Specimen: Blood Updated: 03/21/22 2047    Narrative:      The following orders were created for panel order [place] Draw.  Procedure                               Abnormality         Status                     ---------                               -----------         ------                     Green Top (Gel)[[id number]]                                  Final result               Lavender Top[[id number]]                                     Final result               Gold Top - SST[[id number]]                                   Final result               Cotto Top[[id number]]                                         Final result               Light Blue Top[[id number]]                                   Final result                 Please view results for these tests on the individual orders.    Gray Top [[id number]] Collected: 03/21/22 1635    Specimen: Blood Updated: 03/21/22 2047     Extra Tube Hold for add-ons.     Comment: Auto resulted.       Lavender Top [[id number]] Collected: 03/21/22 1751    Specimen: Blood Updated: 03/21/22 1904     Extra Tube hold for add-on     Comment: Auto resulted       Troponin [[id number]]  (Abnormal) Collected: 03/21/22 1751    Specimen: Blood Updated: 03/21/22 1826     Troponin T 0.121 ng/mL     Narrative:      Troponin T Reference Range:  <= 0.03 ng/mL-   Negative for AMI  >0.03 ng/mL-     Abnormal for myocardial necrosis.  Clinicians would have to utilize clinical acumen, EKG, Troponin and serial changes to determine if it is an Acute Myocardial Infarction or myocardial injury due to an underlying chronic condition.       Results may be falsely decreased if patient taking Biotin.          Imaging Results (Last 24 Hours)     ** No results  "found for the last 24 hours. **        Orders (last 24 hrs)      Start     Ordered    03/23/22 0900  thiamine (VITAMIN B-1) tablet 100 mg  Daily,   Status:  Discontinued        \"And\" Linked Group Details    03/22/22 0108    03/23/22 0900  multivitamin (THERAGRAN) tablet 1 tablet  Daily,   Status:  Discontinued        \"And\" Linked Group Details    03/22/22 0108    03/23/22 0900  folic acid (FOLVITE) tablet 1 mg  Daily,   Status:  Discontinued        \"And\" Linked Group Details    03/22/22 0108    03/23/22 0000  thiamine (VITAMIN B-1) 100 MG tablet tablet  Daily         03/22/22 1601    03/23/22 0000  multivitamin (THERAGRAN) tablet tablet  Daily         03/22/22 1601    03/23/22 0000  folic acid (FOLVITE) 1 MG tablet  Daily         03/22/22 1601    03/22/22 1745  losartan (COZAAR) tablet 25 mg  Every 24 Hours Scheduled,   Status:  Discontinued         03/22/22 1657    03/22/22 1554  Discontinue IV  Once,   Status:  Canceled         03/22/22 1601    03/22/22 1551  Discharge patient  Once         03/22/22 1601    03/22/22 1545  flecainide (TAMBOCOR) tablet 50 mg  Every 12 Hours Scheduled,   Status:  Discontinued         03/22/22 1455    03/22/22 1500  rubidium chloride (CARDIOGEN) injection 1 dose  Once in Imaging         03/22/22 1411    03/22/22 1500  rubidium chloride (CARDIOGEN) injection 1 dose  Once in Imaging         03/22/22 1411    03/22/22 1430  regadenoson (LEXISCAN) injection 0.4 mg  Once         03/22/22 1345    03/22/22 1427  Diet Regular  Diet Effective Now,   Status:  Canceled         03/22/22 1426    03/22/22 1124  hydrOXYzine (ATARAX) tablet 10 mg  3 Times Daily PRN,   Status:  Discontinued         03/22/22 1124    03/22/22 1002  Stress Test With Pet Myocardial Perfusion  Once         03/22/22 1002    03/22/22 0900  aspirin chewable tablet 81 mg  Daily,   Status:  Discontinued         03/21/22 2315    03/22/22 0900  sertraline (ZOLOFT) tablet 50 mg  Daily,   Status:  Discontinued         03/21/22 6905 " "   03/22/22 0900  metoprolol succinate XL (TOPROL-XL) 24 hr tablet 50 mg  Daily,   Status:  Discontinued         03/21/22 2315 03/22/22 0853  T4, Free  Once         03/22/22 0852    03/22/22 0107  LORazepam (ATIVAN) tablet 1 mg  Every 2 Hours PRN,   Status:  Discontinued        \"Or\" Linked Group Details    03/22/22 0108 03/22/22 0107  LORazepam (ATIVAN) injection 1 mg  Every 2 Hours PRN,   Status:  Discontinued        \"Or\" Linked Group Details    03/22/22 0108 03/22/22 0107  LORazepam (ATIVAN) tablet 2 mg  Every 1 Hour PRN,   Status:  Discontinued        \"Or\" Linked Group Details    03/22/22 0108 03/22/22 0107  LORazepam (ATIVAN) injection 2 mg  Every 1 Hour PRN,   Status:  Discontinued        \"Or\" Linked Group Details    03/22/22 0108 03/22/22 0107  LORazepam (ATIVAN) injection 2 mg  Every 15 Minutes PRN,   Status:  Discontinued        \"Or\" Linked Group Details    03/22/22 0108 03/22/22 0107  LORazepam (ATIVAN) injection 2 mg  Every 15 Minutes PRN,   Status:  Discontinued        \"Or\" Linked Group Details    03/22/22 0108 03/22/22 0015  sodium chloride 0.9 % flush 10 mL  Every 12 Hours Scheduled,   Status:  Discontinued         03/21/22 2315 03/22/22 0000  Vital Signs Per Hospital Policy  Every 4 Hours,   Status:  Canceled       03/21/22 2315    03/22/22 0000  flecainide (TAMBOCOR) 50 MG tablet  Every 12 Hours Scheduled         03/22/22 1601    03/22/22 0000  Discharge Follow-up with PCP         03/22/22 1601    03/22/22 0000  Discharge Follow-up with Specified Provider: with Dr. Landeros         03/22/22 1601    03/22/22 0000  losartan (COZAAR) 25 MG tablet  Every 24 Hours Scheduled         03/22/22 1658    03/21/22 2316  Intake & Output  Every Shift,   Status:  Canceled       03/21/22 2315    03/21/22 2315  nitroglycerin (NITROSTAT) SL tablet 0.4 mg  Every 5 Minutes PRN,   Status:  Discontinued         03/21/22 2315 03/21/22 2315  sodium chloride 0.9 % flush 10 mL  As Needed,   Status:  " Discontinued         03/21/22 2315 03/21/22 2315  melatonin tablet 5 mg  Nightly PRN,   Status:  Discontinued         03/21/22 2315 03/21/22 1613  ECG 12 Lead  As Needed,   Status:  Canceled       03/21/22 1613    03/21/22 1613  sodium chloride 0.9 % flush 10 mL  As Needed,   Status:  Discontinued         03/21/22 1613    --  multivitamin with minerals tablet tablet  Daily         03/22/22 1312    --  Zinc 50 MG tablet  Daily         03/22/22 1312                   Physician Progress Notes (last 24 hours)      Adolph Perez MD at 03/22/22 0932          [facility]  IP Progress Note      Chief Complaint/Reason for service: #1 A. fib RVR #2 chest pain with elevated troponin    Subjective   Subjective: 56-year-old male with atrial fibrillation 2007 saw  She recently complaining of palpitations..  He has been on metoprolol for these complaints.  The patient states 1 year ago he had problems with palpitations lasted about 40 minutes.  He then had no symptoms or issues until after he had Covid in January.  Since that time he had multiple episodes of palpitations which is less short periods of time.  This episode however was quite profound where he broke out in a sweat became lightheaded and lay down.  He denied any tightness or heaviness in his chest with this.. The patient is very active and works 2 jobs.  When his heart is not racing he has no dyspnea no fatigue no tightness heaviness squeezing or pressure in his chest jaw throat or arms.  He denies edema.  He is never had blood in his stool.  He is never had a stroke or neurologic event.  He has had no further episodes of syncope    Past medical, surgical, social and family history reviewed in the patient's electronic medical record.    Objective     Vital Sign Min/Max for last 24 hours  Temp  Min: 97.7 °F (36.5 °C)  Max: 98 °F (36.7 °C)   BP  Min: 111/77  Max: 162/105   Pulse  Min: 55  Max: 206   Resp  Min: 16   Max: 18   SpO2  Min: 89 %  Max: 100 %   No data recorded      Intake/Output Summary (Last 24 hours) at 3/22/2022 0932  Last data filed at 3/22/2022 0512  Gross per 24 hour   Intake --   Output 500 ml   Net -500 ml             Current Facility-Administered Medications:   •  aspirin chewable tablet 81 mg, 81 mg, Oral, Daily, Pooja Bravo, APRN, 81 mg at 03/22/22 0819  •  [START ON 3/23/2022] thiamine (VITAMIN B-1) tablet 100 mg, 100 mg, Oral, Daily **AND** [START ON 3/23/2022] multivitamin (THERAGRAN) tablet 1 tablet, 1 tablet, Oral, Daily **AND** [START ON 3/23/2022] folic acid (FOLVITE) tablet 1 mg, 1 mg, Oral, Daily, Hever Reilly III, DO  •  LORazepam (ATIVAN) tablet 1 mg, 1 mg, Oral, Q2H PRN **OR** LORazepam (ATIVAN) injection 1 mg, 1 mg, Intravenous, Q2H PRN **OR** LORazepam (ATIVAN) tablet 2 mg, 2 mg, Oral, Q1H PRN **OR** LORazepam (ATIVAN) injection 2 mg, 2 mg, Intravenous, Q1H PRN **OR** LORazepam (ATIVAN) injection 2 mg, 2 mg, Intravenous, Q15 Min PRN **OR** LORazepam (ATIVAN) injection 2 mg, 2 mg, Intramuscular, Q15 Min PRN, Hever Reilly III, DO  •  melatonin tablet 5 mg, 5 mg, Oral, Nightly PRN, Pooja Bravo, APRN  •  metoprolol succinate XL (TOPROL-XL) 24 hr tablet 50 mg, 50 mg, Oral, Daily, Pooja Bravo APRN, 50 mg at 03/22/22 0819  •  nitroglycerin (NITROSTAT) SL tablet 0.4 mg, 0.4 mg, Sublingual, Q5 Min PRN, Pooja Bravo, APRN  •  sertraline (ZOLOFT) tablet 50 mg, 50 mg, Oral, Daily, Pooja Bravo APRN, 50 mg at 03/22/22 0819  •  sodium chloride 0.9 % flush 10 mL, 10 mL, Intravenous, PRN, Elvin Lujan MD  •  sodium chloride 0.9 % flush 10 mL, 10 mL, Intravenous, Q12H, Pooja Bravo APRN, 10 mL at 03/22/22 0820  •  sodium chloride 0.9 % flush 10 mL, 10 mL, Intravenous, PRN, Pooja Bravo APRN    Physical Exam: General well-developed well-nourished male not dyspneic not tachypneic current heart rate 58 with a normal  "blood pressure        HEENT: No JVP or bruits.  Tongue midline.  Dentition good.  No icterus/EOMI       Respiratory: Equal bilateral symmetrical expansion\" bilaterally       Cardiovascular: Regular rate and rhythm without murmur gallop or click and no edema to palpation       GI: Soft flat nontender positive bowel sounds       Lower Extremities: No lesions       Neuro: Facial expressions are symmetrical.  Handgrip strength equal bilaterally 5/5       Skin: Warm and dry no edema to palpation       Psych: Pleasant affect oriented x3    Results Review: Peak troponin is 0.223.  Heart rates now 57-70.  TSH is elevated at 9.1    Radiology Results:  Imaging Results (Last 72 Hours)     Procedure Component Value Units Date/Time    XR Chest 1 View [[id number]] Collected: 03/21/22 1724     Updated: 03/21/22 1727    Narrative:      XR CHEST 1 VW-     Date of Exam: 3/21/2022 5:11 PM     Indication: Chest Pain triage protocol.     Comparison Exams: None available.     Technique: Single AP chest radiograph     FINDINGS:  The lungs are clear. The heart and mediastinal contours appear normal.  The pulmonary vasculature appears normal. The osseous structures appear  intact.       Impression:      No acute cardiopulmonary process identified.     This report was finalized on 3/21/2022 5:24 PM by Daniel Patel MD.             EKG: First EKG shows A. fib RVR diffuse ST segment depression.  Follow-up EKG shows sinus rhythm no ischemia    ECHO: Pending    Tele: Sinus rhythm  He should return for significant SVT      Electronically signed by Adolph Perez MD at 03/22/22 8703       Consult Notes (last 24 hours)  Notes from 03/22/22 0830 through 03/23/22 0830   No notes of this type exist for this encounter.         "

## 2022-03-24 ENCOUNTER — TELEPHONE (OUTPATIENT)
Dept: CARDIOLOGY | Facility: CLINIC | Age: 57
End: 2022-03-24

## 2022-03-24 NOTE — TELEPHONE ENCOUNTER
Patient called to report that his BP is averaging 155/105 HR 62. Patient denies chest pain, edema, shortness of breath. Patient advised to increase Losartan to 50 mg daily. Patient to call office in one week with BP recordings. Patient is agreeable to plan, all questions answered at this time.

## 2022-03-25 DIAGNOSIS — I48.0 PAROXYSMAL ATRIAL FIBRILLATION: Primary | ICD-10-CM

## 2022-03-28 ENCOUNTER — OFFICE VISIT (OUTPATIENT)
Dept: CARDIOLOGY | Facility: HOSPITAL | Age: 57
End: 2022-03-28

## 2022-03-28 VITALS
HEIGHT: 72 IN | RESPIRATION RATE: 18 BRPM | WEIGHT: 241.5 LBS | SYSTOLIC BLOOD PRESSURE: 134 MMHG | TEMPERATURE: 96.6 F | OXYGEN SATURATION: 98 % | DIASTOLIC BLOOD PRESSURE: 82 MMHG | BODY MASS INDEX: 32.71 KG/M2 | HEART RATE: 56 BPM

## 2022-03-28 DIAGNOSIS — I48.0 PAROXYSMAL ATRIAL FIBRILLATION WITH RAPID VENTRICULAR RESPONSE: ICD-10-CM

## 2022-03-28 DIAGNOSIS — I47.1 SVT (SUPRAVENTRICULAR TACHYCARDIA): Primary | ICD-10-CM

## 2022-03-28 DIAGNOSIS — I10 PRIMARY HYPERTENSION: ICD-10-CM

## 2022-03-28 PROCEDURE — 99214 OFFICE O/P EST MOD 30 MIN: CPT | Performed by: NURSE PRACTITIONER

## 2022-03-28 RX ORDER — NAPROXEN SODIUM 220 MG
440 TABLET ORAL 2 TIMES DAILY PRN
Status: ON HOLD | COMMUNITY
End: 2022-07-15

## 2022-03-28 NOTE — PROGRESS NOTES
"Chief Complaint  Atrial Fibrillation and Establish Care    Subjective      History of Present Illness {CC  Problem List  Visit  Diagnosis   Encounters  Notes  Medications  Labs  Result Review Imaging  Media :23}     Asim Priest, 56 y.o. male with newly diagnosed SVT, HTN, HLD, paroxysmal AF (isolated event 2007), RACHEL presents to [facility] Heart and Valve clinic for Atrial Fibrillation and Establish Care.    Patient recently hospitalized at [facility] for complaints of palpitations; work-up revealed SVT.  Patient did have elevated troponin on presentation with subsequent PET stress test with no evidence of ischemia.  Patient's ECG was discussed with electrophysiology and patient is planned for follow-up with Dr. Landeros in approximately 6 weeks to discuss ablation.  Patient was also initiated on flecainide.  TSH was noted to be significantly elevated on lab work.    Patient presents today feeling well overall since his recent hospital discharge.  No further arrhythmia symptoms since discharge.  He notes that his SVT episode last week occurred at work with loss of consciousness. Notes history of increased palpitations after COVID-19 infection, with monitor placement recently by Dr. Coley. He has increased Losartan last Saturday with gradual improvement in his blood pressure. He denies chest pain, dyspnea, racing heart, and syncope since ED evaluation.      Objective     Vital Signs:   Vitals:    03/28/22 1244 03/28/22 1245 03/28/22 1246   BP: 133/81 138/95 134/82   BP Location: Right arm Left arm Left arm   Patient Position: Sitting Standing Sitting   Cuff Size: Adult Adult Adult   Pulse: 58 61 56   Resp:   18   Temp: 96.6 °F (35.9 °C) 96.6 °F (35.9 °C) 96.6 °F (35.9 °C)   TempSrc: Temporal Temporal Temporal   SpO2: 98% 97% 98%   Weight:   110 kg (241 lb 8 oz)   Height:   182.9 cm (72\")     Body mass index is 32.75 kg/m².  Physical Exam  Vitals and nursing note reviewed. "   Constitutional:       Appearance: Normal appearance.   HENT:      Head: Normocephalic.   Eyes:      Extraocular Movements: Extraocular movements intact.   Neck:      Vascular: No carotid bruit.   Cardiovascular:      Rate and Rhythm: Normal rate and regular rhythm.      Pulses: Normal pulses.      Heart sounds: Normal heart sounds, S1 normal and S2 normal. No murmur heard.  Pulmonary:      Effort: Pulmonary effort is normal. No respiratory distress.      Breath sounds: Normal breath sounds.   Musculoskeletal:      Cervical back: Neck supple.      Right lower leg: No edema.      Left lower leg: No edema.   Skin:     General: Skin is warm and dry.   Neurological:      General: No focal deficit present.      Mental Status: He is alert.   Psychiatric:         Mood and Affect: Mood normal.         Behavior: Behavior normal.         Thought Content: Thought content normal.        Data Reviewed:{ Labs  Result Review  Imaging  Med Tab  Media :23}     ECG 12 Lead (03/21/2022 16:21)  Adult Transthoracic Echo Complete With Contrast if Necessary Per Protocol (03/22/2022 11:04)  ECG 12 Lead (03/22/2022 11:08)  Stress Test With Pet Myocardial Perfusion (03/22/2022 13:46)  T4, Free (03/22/2022 03:00)  CBC (No Diff) (03/22/2022 03:00)  Magnesium (03/22/2022 03:00)  Lipid Panel (03/22/2022 03:00)  TSH (03/22/2022 03:00)      Assessment/Plan   Assessment and Plan {CC Problem List  Visit Diagnosis  ROS  Review (Popup)  Health Maintenance  Quality  BestPractice  Medications  SmartSets  SnapShot Encounters  Media :23}     1. SVT (supraventricular tachycardia) (HCC)  -Newly diagnosed during recent emergency department evaluation.  -No further arrhythmia symptoms since discharge  -Recent Holter monitor placement by Dr. Coley, results pending.  -TTE with preserved LV systolic function, no significant valvular abnormality  -Stress PET myocardial perfusion within normal limits during hospitalization  -Continue  flecainide/Toprol-XL as prescribed  -Continue follow-up with Dr. Landeros for possible SVT ablation    2. Paroxysmal atrial fibrillation with rapid ventricular response (HCC)  -1 remote episode 2007  -Recent Holter monitor with results pending  -Continue Toprol-XL, ASA as prescribed    3. Primary hypertension  -Reasonable control at time of visit after losartan recently increased  -Continue home BP monitor  -Follow-up in heart valve clinic as needed or instructed by Dr. Coley      Follow Up {Instructions Charge Capture  Follow-up Communications :23}     Return if symptoms worsen or fail to improve.      Patient was given instructions and counseling regarding his condition or for health maintenance advice. Please see specific information pulled into the AVS if appropriate.  Patient was instructed to call the [facility] with any questions, concerns, or worsening symptoms.    Dictated Utilizing Dragon Dictation   Please note that portions of this note were completed with a voice recognition program.   Part of this note may be an electronic transcription/translation of spoken language to printed text using the Dragon Dictation System.

## 2022-04-04 ENCOUNTER — TELEPHONE (OUTPATIENT)
Dept: CARDIOLOGY | Facility: CLINIC | Age: 57
End: 2022-04-04

## 2022-04-04 NOTE — TELEPHONE ENCOUNTER
----- Message from Bertram Coley MD sent at 4/4/2022 12:55 PM EDT -----  Patient is already aware of his heart monitor results, since he was in the ER for his arrhythmia.  He needs a repeat ECG since starting flecainide.  Also upcoming possible ablation, recommend repeat echocardiogram.  Lastly, can you see if he got set up for his appointment?  If not, can help make this happen?  Thanks    ----- Message -----  From: Bertram Coley MD  Sent: 4/4/2022  12:55 PM EDT  To: Bertram Coley MD

## 2022-04-04 NOTE — TELEPHONE ENCOUNTER
Patient contacted to review. Patient agreeable to come in this week for EKG following initiation of Flecainide. Will have scheduling reach out to patient to schedule appt with GFT.     Of note- per MJS, patient does not need repeat Echo. Echo last done on 03/22.

## 2022-05-03 RX ORDER — FLECAINIDE ACETATE 50 MG/1
50 TABLET ORAL EVERY 12 HOURS SCHEDULED
Qty: 180 TABLET | Refills: 3 | Status: SHIPPED | OUTPATIENT
Start: 2022-05-03

## 2022-06-06 ENCOUNTER — OFFICE VISIT (OUTPATIENT)
Dept: CARDIOLOGY | Facility: CLINIC | Age: 57
End: 2022-06-06

## 2022-06-06 VITALS
SYSTOLIC BLOOD PRESSURE: 132 MMHG | HEART RATE: 80 BPM | DIASTOLIC BLOOD PRESSURE: 68 MMHG | OXYGEN SATURATION: 98 % | BODY MASS INDEX: 32.78 KG/M2 | HEIGHT: 72 IN | WEIGHT: 242 LBS

## 2022-06-06 DIAGNOSIS — I47.1 SVT (SUPRAVENTRICULAR TACHYCARDIA): Primary | ICD-10-CM

## 2022-06-06 DIAGNOSIS — G47.30 SLEEP APNEA, UNSPECIFIED TYPE: ICD-10-CM

## 2022-06-06 PROBLEM — I25.10 CAD (CORONARY ARTERY DISEASE): Status: ACTIVE | Noted: 2022-06-06

## 2022-06-06 PROBLEM — R79.89 ELEVATED TROPONIN: Status: RESOLVED | Noted: 2022-03-21 | Resolved: 2022-06-06

## 2022-06-06 PROBLEM — I25.810 CAD (CORONARY ARTERY DISEASE) OF ARTERY BYPASS GRAFT: Status: ACTIVE | Noted: 2022-06-06

## 2022-06-06 PROBLEM — E03.9 HYPOTHYROIDISM (ACQUIRED): Status: ACTIVE | Noted: 2022-06-06

## 2022-06-06 PROBLEM — R77.8 ELEVATED TROPONIN: Status: RESOLVED | Noted: 2022-03-21 | Resolved: 2022-06-06

## 2022-06-06 PROBLEM — R09.02 HYPOXIA: Status: RESOLVED | Noted: 2022-03-21 | Resolved: 2022-06-06

## 2022-06-06 PROCEDURE — 99204 OFFICE O/P NEW MOD 45 MIN: CPT | Performed by: INTERNAL MEDICINE

## 2022-06-06 PROCEDURE — 93000 ELECTROCARDIOGRAM COMPLETE: CPT | Performed by: INTERNAL MEDICINE

## 2022-06-06 RX ORDER — LORAZEPAM 1 MG/1
1 TABLET ORAL 3 TIMES DAILY PRN
COMMUNITY
Start: 2022-03-29

## 2022-06-06 NOTE — PROGRESS NOTES
"        Cardiac Electrophysiology Outpatient Consult Note            [facility] at     Consult Note     Asim Priest  [id number]  06/06/2022  [phone]     Primary Care Physician: Breezy Horn NP-C    Referred By: Adolph Perez MD    Subjective     Chief Complaint:   Diagnoses and all orders for this visit:    1. SVT (supraventricular tachycardia) (HCC) (Primary)      Chief Complaint   Patient presents with   • Paroxysmal atrial fibrillation       History of Present Illness:   Asim Priest is a 56 y.o. male who presents to my electrophysiology clinic for evaluation of SVT.  He was recently evaluated by interventional cardiology for palpitations and history of SVT.  He was put on a 2-week RackupO monitor.  While wearing the monitor he had an episode of SVT with heart rate over 250 bpm.  This is of sudden onset and made him feel \"weird\".  He had associated shortness of breath and dizziness.  He presented to the [facility] emergency department where he was in rapid SVT that was converted to sinus rhythm with electrical cardioversion.  He was admitted for cardiac testing.  Had a cardiac PET scan which showed no reversible ischemia.  He had an echocardiogram showing normal LV systolic function and normal valvular morphology.  He was discharged to home on flecainide which she has continued.  He had a recurrent episode of SVT about 2 weeks ago lasting approximately 10 minutes.  This was terminated with an extra dose of flecainide.  He has no current complaint of exertional chest pain or dyspnea and orthopnea no PND no claudication no lower extremity edema.  Incidentally while admitted his thyroid function was checked and his TSH is quite high.    Past Medical History:   Patient Active Problem List    Diagnosis Date Noted   • SVT (supraventricular tachycardia) (HCC) 03/21/2022     Priority: High     Note Last Updated: 6/6/2022     Echo 3-22-22: Estimated left " ventricular EF = 60% Left ventricular systolic function is normal. There is mild MR  14-day Holter monitor, 3/21/2022: Multiple SVTs, the longest 2 hours and 51 minutes with a maximum heart rate of 231 bpm.     • Paroxysmal atrial fibrillation with rapid ventricular response (HCC) 03/21/2022     Priority: High   • CAD (coronary artery disease) 06/06/2022     Priority: Medium     Note Last Updated: 6/6/2022     Cardiac PET scan, 3/22/2022: LexiPet scan within normal limits. REST EF = 61% STRESS EF = 67%. Minor LAD coronary calcification noted.     • HTN (hypertension) 03/21/2022     Priority: Low   • HLD (hyperlipidemia) 03/21/2022     Priority: Low   • Sleep apnea 03/21/2022     Priority: Low   • Hypothyroidism (acquired) 06/06/2022       Past Surgical History:   Past Surgical History:   Procedure Laterality Date   • CARDIAC CATHETERIZATION  2018, 1997    negative findings   • CARDIOVERSION  03/04/2022   • KNEE SURGERY     • SHOULDER SURGERY         Social History:   Social History     Socioeconomic History   • Marital status:    Tobacco Use   • Smoking status: Never Smoker   • Smokeless tobacco: Never Used   Vaping Use   • Vaping Use: Never used   Substance and Sexual Activity   • Alcohol use: Yes     Comment: social   • Drug use: Never   • Sexual activity: Defer       Medications:     Current Outpatient Medications:   •  aspirin 81 MG chewable tablet, Chew 81 mg Daily. OTC, Disp: , Rfl:   •  flecainide (TAMBOCOR) 50 MG tablet, Take 1 tablet by mouth Every 12 (Twelve) Hours., Disp: 180 tablet, Rfl: 3  •  LORazepam (ATIVAN) 1 MG tablet, Take 1 mg by mouth 3 (Three) Times a Day As Needed. for anxiety, Disp: , Rfl:   •  losartan (COZAAR) 25 MG tablet, Take 1 tablet by mouth Daily. (Patient taking differently: Take 50 mg by mouth Daily.), Disp: 90 tablet, Rfl: 3  •  metoprolol succinate XL (TOPROL-XL) 50 MG 24 hr tablet, Take 50 mg by mouth Daily., Disp: , Rfl:   •  multivitamin with minerals tablet tablet, Take  "1 tablet by mouth Daily. OTC, Disp: , Rfl:   •  naproxen sodium (ALEVE) 220 MG tablet, Take 440 mg by mouth 2 (Two) Times a Day As Needed for Headache or Mild Pain ., Disp: , Rfl:   •  nitroglycerin (NITROSTAT) 0.4 MG SL tablet, Place 1 tablet under the tongue Every 5 (Five) Minutes As Needed (chest pain). Take no more than 3 doses in 15 minutes., Disp: 25 tablet, Rfl: 3  •  sertraline (ZOLOFT) 50 MG tablet, Take 50 mg by mouth Daily., Disp: , Rfl:   •  Zinc 50 MG tablet, Take 50 mg by mouth Daily. OTC, Disp: , Rfl:     Allergies:   Allergies   Allergen Reactions   • Penicillins Other (See Comments)     Reaction unknown, was advised by family        Objective   Vital Signs:   Vitals:    06/06/22 1420   BP: 132/68   BP Location: Left arm   Patient Position: Sitting   Cuff Size: Adult   Pulse: 80   SpO2: 98%   Weight: 110 kg (242 lb)   Height: 182.9 cm (72\")       PHYSICAL EXAM  General appearance: Awake, alert, cooperative  Head: Normocephalic, without obvious abnormality, atraumatic  Eyes: Conjunctivae/corneas clear, EOMs intact  Neck: no adenopathy, no carotid bruit, no JVD and thyroid: not enlarged  Lungs: clear to auscultation bilaterally and no rhonchi or crackles\", ' symmetric  Heart: regular rate and rhythm, S1, S2 normal, no murmur, click, rub or gallop  Abdomen: Soft, non-tender, bowel sounds normal,  no organomegaly  Extremities: extremities normal, atraumatic, no cyanosis or edema  Skin: Skin color, turgor normal, no rashes or lesions  Neurologic: Grossly normal     Lab Results   Component Value Date    GLUCOSE 89 03/22/2022    CALCIUM 8.6 03/22/2022     03/22/2022    K 3.9 03/22/2022    CO2 22.0 03/22/2022     (H) 03/22/2022    BUN 11 03/22/2022    CREATININE 0.97 03/22/2022    BCR 11.3 03/22/2022    ANIONGAP 10.0 03/22/2022     Lab Results   Component Value Date    WBC 7.21 03/22/2022    HGB 14.4 03/22/2022    HCT 42.0 03/22/2022    MCV 88.2 03/22/2022     03/22/2022     Lab Results "   Component Value Date    INR 1.13 03/22/2022    PROTIME 14.2 03/22/2022     Lab Results   Component Value Date    TSH 9.730 (H) 03/22/2022       Cardiac Testing:               I personally viewed and interpreted the patient's EKG/Telemetry/lab data      ECG 12 Lead    Date/Time: 6/6/2022 3:07 PM  Performed by: Mervin Muñiz DO  Authorized by: Mervin Muñiz DO   Previous ECG: no previous ECG available  Rhythm: sinus rhythm  Rate: normal  BPM: 69  Conduction: conduction normal  Q waves: III    ST Segments: ST segments normal  T inversion: III  QRS axis: normal  Other findings: non-specific ST-T wave changes and left ventricular hypertrophy    Clinical impression: abnormal EKG            Tobacco Cessation: N/A  Obstructive Sleep Apnea Screening: Deffered    Assessment & Plan    Diagnoses and all orders for this visit:    1. SVT (supraventricular tachycardia) (HCC) (Primary)         Diagnosis Plan   1. SVT (supraventricular tachycardia) (HCC)   highly symptomatic recurrent documented paroxysmal SVT.    Patient has had breakthrough episodes with flecainide.    Is not interested in taking antiarrhythmic drugs in the long-term.    Discussed with him the option of catheter ablation.  Quoted him a 1% chance of significant procedural complication including a 0.3% chance of iatrogenic complete heart block resulting in the need for a permanent pacemaker.  Other complications discussed include bleeding stroke myocardial infarction death all quite unlikely but still possible.    There are our references in the chart him having atrial fibrillation as well.  He does have several risk factors for this but we do not have any documented episodes of A. fib.  He does discussed that indeed in 2006 he had an episode where he was told he had A. fib in the hospital but indeed this could have been SVT is easily he thinks.    Has a CPAP machine but does not wear it.  It fits poorly.  He is never met a sleep medicine physician and request  that he be referred to 1.  We will arrange for this here at [facility].    He wished to proceed.    We will stop his flecainide now.    Rhythmia    No need for anesthesia.     Body mass index is 32.82 kg/m².    I spent 46 minutes in consultation with this patient which included more than 65% of this time in direct face-to-face counseling, physical examination and discussion of my assessment and findings and shared decision making with the patient.  The remainder of the time not spent face to face was performing one, some or all of the following actions:  preparing to see this patient ( eg. Review of tests),  ordering medications, tests or procedures ), care coordination, discussion of the plan with other healthcare providers, documenting clinical information in Epic well as independently interpreting results and communicating results to patient, family and or caregiver.  All time noted occurred on the date of service.    Follow Up:       Thank you for allowing me to participate in the care of your patient. Please to not hesitate to contact me with additional questions or concerns.      Mervin Muñiz DO, FACC, RS  Cardiac Electrophysiologist  [facility] / [facility]    Scribed for Mervin Muñiz DO Electronically signed by EDU Caputo, 06/06/22, 3:07 PM EDT.

## 2022-06-09 ENCOUNTER — PREP FOR SURGERY (OUTPATIENT)
Dept: OTHER | Facility: HOSPITAL | Age: 57
End: 2022-06-09

## 2022-06-09 DIAGNOSIS — I47.1 SVT (SUPRAVENTRICULAR TACHYCARDIA): Primary | ICD-10-CM

## 2022-06-09 RX ORDER — NITROGLYCERIN 0.4 MG/1
0.4 TABLET SUBLINGUAL
Status: CANCELLED | OUTPATIENT
Start: 2022-06-09

## 2022-06-09 RX ORDER — ACETAMINOPHEN 325 MG/1
650 TABLET ORAL EVERY 4 HOURS PRN
Status: CANCELLED | OUTPATIENT
Start: 2022-06-09

## 2022-06-09 RX ORDER — ONDANSETRON 2 MG/ML
4 INJECTION INTRAMUSCULAR; INTRAVENOUS EVERY 6 HOURS PRN
Status: CANCELLED | OUTPATIENT
Start: 2022-06-09

## 2022-07-15 ENCOUNTER — HOSPITAL ENCOUNTER (OUTPATIENT)
Facility: HOSPITAL | Age: 57
Setting detail: HOSPITAL OUTPATIENT SURGERY
Discharge: HOME | End: 2022-07-15
Attending: INTERNAL MEDICINE | Admitting: INTERNAL MEDICINE

## 2022-07-15 VITALS
OXYGEN SATURATION: 99 % | BODY MASS INDEX: 32.15 KG/M2 | RESPIRATION RATE: 16 BRPM | TEMPERATURE: 97.6 F | WEIGHT: 237.4 LBS | HEIGHT: 72 IN | SYSTOLIC BLOOD PRESSURE: 139 MMHG | HEART RATE: 62 BPM | DIASTOLIC BLOOD PRESSURE: 97 MMHG

## 2022-07-15 DIAGNOSIS — I47.1 SVT (SUPRAVENTRICULAR TACHYCARDIA): ICD-10-CM

## 2022-07-15 LAB
ANION GAP SERPL CALCULATED.3IONS-SCNC: 11 MMOL/L (ref 5–15)
BUN SERPL-MCNC: 13 MG/DL (ref 6–20)
BUN/CREAT SERPL: 15.3 (ref 7–25)
CALCIUM SPEC-SCNC: 8.8 MG/DL (ref 8.6–10.5)
CHLORIDE SERPL-SCNC: 103 MMOL/L (ref 98–107)
CO2 SERPL-SCNC: 26 MMOL/L (ref 22–29)
CREAT SERPL-MCNC: 0.85 MG/DL (ref 0.76–1.27)
DEPRECATED RDW RBC AUTO: 42.5 FL (ref 37–54)
EGFRCR SERPLBLD CKD-EPI 2021: 100.7 ML/MIN/1.73
ERYTHROCYTE [DISTWIDTH] IN BLOOD BY AUTOMATED COUNT: 13 % (ref 12.3–15.4)
GLUCOSE SERPL-MCNC: 97 MG/DL (ref 65–99)
HCT VFR BLD AUTO: 44.3 % (ref 37.5–51)
HGB BLD-MCNC: 15.4 G/DL (ref 13–17.7)
MCH RBC QN AUTO: 31.2 PG (ref 26.6–33)
MCHC RBC AUTO-ENTMCNC: 34.8 G/DL (ref 31.5–35.7)
MCV RBC AUTO: 89.9 FL (ref 79–97)
PLATELET # BLD AUTO: 162 10*3/MM3 (ref 140–450)
PMV BLD AUTO: 9.1 FL (ref 6–12)
POTASSIUM SERPL-SCNC: 4.2 MMOL/L (ref 3.5–5.2)
RBC # BLD AUTO: 4.93 10*6/MM3 (ref 4.14–5.8)
SODIUM SERPL-SCNC: 140 MMOL/L (ref 136–145)
WBC NRBC COR # BLD: 6.93 10*3/MM3 (ref 3.4–10.8)

## 2022-07-15 PROCEDURE — 85027 COMPLETE CBC AUTOMATED: CPT | Performed by: PHYSICIAN ASSISTANT

## 2022-07-15 PROCEDURE — 80048 BASIC METABOLIC PNL TOTAL CA: CPT | Performed by: PHYSICIAN ASSISTANT

## 2022-07-15 PROCEDURE — S0260 H&P FOR SURGERY: HCPCS | Performed by: PHYSICIAN ASSISTANT

## 2022-07-15 PROCEDURE — 36415 COLL VENOUS BLD VENIPUNCTURE: CPT

## 2022-07-15 RX ORDER — NITROGLYCERIN 0.4 MG/1
0.4 TABLET SUBLINGUAL
Status: DISCONTINUED | OUTPATIENT
Start: 2022-07-15 | End: 2022-07-15 | Stop reason: HOSPADM

## 2022-07-15 RX ORDER — ACETAMINOPHEN 325 MG/1
650 TABLET ORAL EVERY 4 HOURS PRN
Status: DISCONTINUED | OUTPATIENT
Start: 2022-07-15 | End: 2022-07-15 | Stop reason: HOSPADM

## 2022-07-15 RX ORDER — ONDANSETRON 2 MG/ML
4 INJECTION INTRAMUSCULAR; INTRAVENOUS EVERY 6 HOURS PRN
Status: DISCONTINUED | OUTPATIENT
Start: 2022-07-15 | End: 2022-07-15 | Stop reason: HOSPADM

## 2022-07-15 NOTE — H&P
[facility] Electrophysiology    Date of Hospital Visit: 07/15/22    Place of Service: [facility]    Patient Name: Asim Priest  :1965      Primary Care Provider: Breezy Horn NP-C    Chief complaint/Reason for Consultation:  SVT    Problem List:  Active Hospital Problems    Diagnosis    • **SVT (supraventricular tachycardia) (HCC)      · Echo 3-22-22: Estimated left ventricular EF = 60% Left ventricular systolic function is normal. There is mild MR  · 14-day Holter monitor, 3/21/2022: Multiple SVTs, the longest 2 hours and 51 minutes with a maximum heart rate of 231 bpm.     • Paroxysmal atrial fibrillation with rapid ventricular response (HCC)    • CAD (coronary artery disease)      · Cardiac PET scan, 3/22/2022: LexiPet scan within normal limits. REST EF = 61% STRESS EF = 67%. Minor LAD coronary calcification noted.     • HTN (hypertension)    • HLD (hyperlipidemia)    • Sleep apnea    • Hypothyroidism (acquired)             History of Present Illness:  This is a 57-year-old male originally evaluated in the electrophysiology clinic in .  He had presented to the [facility] emergency department with SVT.  He was electrically converted in the emergency department.  He was admitted for cardiac testing which included a cardiac PET scan which was negative for ischemic disease.  He had a echocardiogram which was normal.  He was started on flecainide and discharged to home.  He had recurrent episode of SVT about 2 weeks later.  He presents today EP study and SVT ablation.        Allergies   Allergen Reactions   • Penicillins Other (See Comments)     Reaction unknown, was advised by family        Current Outpatient Medications   Medication Instructions   • aspirin 81 mg, Oral, Daily, OTC   • flecainide (TAMBOCOR) 50 mg, Oral, Every 12 Hours Scheduled   • LORazepam (ATIVAN) 1 mg, Oral, 3 Times Daily PRN, for anxiety   • losartan (COZAAR) 25 mg, Oral, Every 24 Hours  "Scheduled   • metoprolol succinate XL (TOPROL-XL) 50 mg, Oral, Daily   • multivitamin with minerals tablet tablet 1 tablet, Oral, Daily, OTC   • naproxen sodium (ALEVE) 440 mg, Oral, 2 Times Daily PRN   • nitroglycerin (NITROSTAT) 0.4 mg, Sublingual, Every 5 Minutes PRN, Take no more than 3 doses in 15 minutes.   • sertraline (ZOLOFT) 50 mg, Oral, Daily   • Zinc 50 mg, Oral, Daily, OTC           Social History     Socioeconomic History   • Marital status:    Tobacco Use   • Smoking status: Never Smoker   • Smokeless tobacco: Never Used   Vaping Use   • Vaping Use: Never used   Substance and Sexual Activity   • Alcohol use: Yes     Comment: social   • Drug use: Never   • Sexual activity: Defer       Family History   Problem Relation Age of Onset   • Arrhythmia Mother    • Heart disease Father    • Heart attack Father    • Heart attack Maternal Grandfather    • Heart attack Paternal Grandfather        REVIEW OF SYSTEMS:   Review of Systems   Constitutional: Negative for diaphoresis, malaise/fatigue and weight gain.   Cardiovascular: Positive for palpitations. Negative for chest pain, claudication, dyspnea on exertion, irregular heartbeat, leg swelling, orthopnea, paroxysmal nocturnal dyspnea and syncope.   Respiratory: Negative for cough, shortness of breath, sleep disturbances due to breathing and wheezing.    Hematologic/Lymphatic: Negative for bleeding problem.   Musculoskeletal: Negative for muscle cramps, muscle weakness and myalgias.   Gastrointestinal: Negative for heartburn.   Neurological: Negative for weakness.            Objective:  Vitals:    07/15/22 1000   BP: 139/97   BP Location: Right arm   Patient Position: Lying   Pulse: 62   Resp: 16   Temp: 97.6 °F (36.4 °C)   SpO2: 99%   Weight: 108 kg (237 lb 6.4 oz)   Height: 182.9 cm (72\")     Body mass index is 32.2 kg/m².      Vitals reviewed.   Constitutional:       Appearance: Well-developed.   Pulmonary:      Effort: Pulmonary effort is normal. No " respiratory distress.      Breath sounds: Normal breath sounds. No wheezing. No rales.      Comments: Bases clear  Chest:      Chest wall: Not tender to palpatation.   Cardiovascular:      Normal rate. Regular rhythm.      Murmurs: There is no murmur.      No gallop. No click. No rub.   Pulses:     Intact distal pulses.   Edema:     Peripheral edema absent.   Musculoskeletal: Normal range of motion.           Lab Review:       Results from last 7 days   Lab Units 07/15/22  1027   SODIUM mmol/L 140   POTASSIUM mmol/L 4.2   CHLORIDE mmol/L 103   CO2 mmol/L 26.0   BUN mg/dL 13   CREATININE mg/dL 0.85   GLUCOSE mg/dL 97   CALCIUM mg/dL 8.8     Results from last 7 days   Lab Units 07/15/22  1027   WBC 10*3/mm3 6.93   HEMOGLOBIN g/dL 15.4   HEMATOCRIT % 44.3   PLATELETS 10*3/mm3 162     Estimated Creatinine Clearance: 120.3 mL/min (by C-G formula based on SCr of 0.85 mg/dL).        Assessment:   · SVT        Plan:   · EP study and ablation of svt        Electronically signed by EDU Caputo, 07/15/22, 10:27 AM EDT.

## 2022-07-15 NOTE — NURSING NOTE
Patient arrived @ 0955 today, scheduled for 1000 arrival. As of now, 1417, the patient is still waiting for his procedure. The patient and the wife are extremely frustrated and upset that he has not gone for his procedure. The wife requested that she be able to speak with a patient advocate about her concerns. I have spoken with Emilie Cruz about this and she will come talk with her and the patient. I have also spoken with Krysten Mehta, EP manager, about this patient and his concerns and she has also notified Higinio Oliva, . At 1426, the patient has decided to leave AMA. Dr Muñiz has been notified and is okay with the patient leaving, is not coming to bedside to see the patient. At 1431, patient was presented with AMA paperwork and refuses to sign the forms. I reviewed what I could with the patient and his wife and they politely refused and walked out.

## 2022-12-01 ENCOUNTER — TRANSCRIBE ORDERS (OUTPATIENT)
Dept: ADMINISTRATIVE | Facility: HOSPITAL | Age: 57
End: 2022-12-01

## 2022-12-01 DIAGNOSIS — R10.84 ABDOMINAL PAIN, GENERALIZED: Primary | ICD-10-CM

## 2022-12-02 ENCOUNTER — HOSPITAL ENCOUNTER (OUTPATIENT)
Dept: CT IMAGING | Facility: HOSPITAL | Age: 57
Discharge: HOME | End: 2022-12-02
Admitting: NURSE PRACTITIONER

## 2022-12-02 DIAGNOSIS — R10.84 ABDOMINAL PAIN, GENERALIZED: ICD-10-CM

## 2022-12-02 LAB — CREAT BLDA-MCNC: 0.9 MG/DL (ref 0.6–1.3)

## 2022-12-02 PROCEDURE — 74177 CT ABD & PELVIS W/CONTRAST: CPT

## 2022-12-02 PROCEDURE — 25010000002 IOPAMIDOL 61 % SOLUTION: Performed by: NURSE PRACTITIONER

## 2022-12-02 PROCEDURE — 82565 ASSAY OF CREATININE: CPT

## 2022-12-02 PROCEDURE — 74177 CT ABD & PELVIS W/CONTRAST: CPT | Performed by: RADIOLOGY

## 2022-12-02 RX ADMIN — IOPAMIDOL 100 ML: 612 INJECTION, SOLUTION INTRAVENOUS at 09:47

## 2022-12-14 ENCOUNTER — HOSPITAL ENCOUNTER (EMERGENCY)
Facility: HOSPITAL | Age: 57
Discharge: HOME | End: 2022-12-14
Attending: EMERGENCY MEDICINE | Admitting: EMERGENCY MEDICINE

## 2022-12-14 ENCOUNTER — APPOINTMENT (OUTPATIENT)
Dept: CT IMAGING | Facility: HOSPITAL | Age: 57
End: 2022-12-14

## 2022-12-14 VITALS
SYSTOLIC BLOOD PRESSURE: 151 MMHG | HEART RATE: 88 BPM | HEIGHT: 72 IN | RESPIRATION RATE: 20 BRPM | WEIGHT: 242 LBS | OXYGEN SATURATION: 99 % | DIASTOLIC BLOOD PRESSURE: 96 MMHG | BODY MASS INDEX: 32.78 KG/M2 | TEMPERATURE: 97.9 F

## 2022-12-14 DIAGNOSIS — I10 PRIMARY HYPERTENSION: ICD-10-CM

## 2022-12-14 DIAGNOSIS — N32.89 BLADDER WALL THICKENING: Primary | ICD-10-CM

## 2022-12-14 LAB
ALBUMIN SERPL-MCNC: 4.4 G/DL (ref 3.5–5.2)
ALBUMIN/GLOB SERPL: 1.6 G/DL
ALP SERPL-CCNC: 76 U/L (ref 39–117)
ALT SERPL W P-5'-P-CCNC: 37 U/L (ref 1–41)
ANION GAP SERPL CALCULATED.3IONS-SCNC: 10.7 MMOL/L (ref 5–15)
AST SERPL-CCNC: 24 U/L (ref 1–40)
BASOPHILS # BLD AUTO: 0.05 10*3/MM3 (ref 0–0.2)
BASOPHILS NFR BLD AUTO: 0.7 % (ref 0–1.5)
BILIRUB SERPL-MCNC: 0.5 MG/DL (ref 0–1.2)
BILIRUB UR QL STRIP: NEGATIVE
BUN SERPL-MCNC: 12 MG/DL (ref 6–20)
BUN/CREAT SERPL: 13.8 (ref 7–25)
CALCIUM SPEC-SCNC: 9.2 MG/DL (ref 8.6–10.5)
CHLORIDE SERPL-SCNC: 104 MMOL/L (ref 98–107)
CLARITY UR: CLEAR
CO2 SERPL-SCNC: 22.3 MMOL/L (ref 22–29)
COLOR UR: YELLOW
CREAT SERPL-MCNC: 0.87 MG/DL (ref 0.76–1.27)
CRP SERPL-MCNC: <0.3 MG/DL (ref 0–0.5)
DEPRECATED RDW RBC AUTO: 41.9 FL (ref 37–54)
EGFRCR SERPLBLD CKD-EPI 2021: 100 ML/MIN/1.73
EOSINOPHIL # BLD AUTO: 0.06 10*3/MM3 (ref 0–0.4)
EOSINOPHIL NFR BLD AUTO: 0.8 % (ref 0.3–6.2)
ERYTHROCYTE [DISTWIDTH] IN BLOOD BY AUTOMATED COUNT: 13 % (ref 12.3–15.4)
GLOBULIN UR ELPH-MCNC: 2.7 GM/DL
GLUCOSE SERPL-MCNC: 99 MG/DL (ref 65–99)
GLUCOSE UR STRIP-MCNC: NEGATIVE MG/DL
HCT VFR BLD AUTO: 45.4 % (ref 37.5–51)
HGB BLD-MCNC: 15.8 G/DL (ref 13–17.7)
HGB UR QL STRIP.AUTO: NEGATIVE
HOLD SPECIMEN: NORMAL
HOLD SPECIMEN: NORMAL
IMM GRANULOCYTES # BLD AUTO: 0.02 10*3/MM3 (ref 0–0.05)
IMM GRANULOCYTES NFR BLD AUTO: 0.3 % (ref 0–0.5)
KETONES UR QL STRIP: NEGATIVE
LEUKOCYTE ESTERASE UR QL STRIP.AUTO: NEGATIVE
LIPASE SERPL-CCNC: 21 U/L (ref 13–60)
LYMPHOCYTES # BLD AUTO: 1.55 10*3/MM3 (ref 0.7–3.1)
LYMPHOCYTES NFR BLD AUTO: 20.3 % (ref 19.6–45.3)
MCH RBC QN AUTO: 30.9 PG (ref 26.6–33)
MCHC RBC AUTO-ENTMCNC: 34.8 G/DL (ref 31.5–35.7)
MCV RBC AUTO: 88.7 FL (ref 79–97)
MONOCYTES # BLD AUTO: 0.49 10*3/MM3 (ref 0.1–0.9)
MONOCYTES NFR BLD AUTO: 6.4 % (ref 5–12)
NEUTROPHILS NFR BLD AUTO: 5.46 10*3/MM3 (ref 1.7–7)
NEUTROPHILS NFR BLD AUTO: 71.5 % (ref 42.7–76)
NITRITE UR QL STRIP: NEGATIVE
NRBC BLD AUTO-RTO: 0 /100 WBC (ref 0–0.2)
PH UR STRIP.AUTO: 6.5 [PH] (ref 5–8)
PLATELET # BLD AUTO: 171 10*3/MM3 (ref 140–450)
PMV BLD AUTO: 9 FL (ref 6–12)
POTASSIUM SERPL-SCNC: 4.2 MMOL/L (ref 3.5–5.2)
PROT SERPL-MCNC: 7.1 G/DL (ref 6–8.5)
PROT UR QL STRIP: NEGATIVE
QT INTERVAL: 410 MS
QTC INTERVAL: 470 MS
RBC # BLD AUTO: 5.12 10*6/MM3 (ref 4.14–5.8)
SODIUM SERPL-SCNC: 137 MMOL/L (ref 136–145)
SP GR UR STRIP: 1.01 (ref 1–1.03)
TROPONIN T SERPL-MCNC: <0.01 NG/ML (ref 0–0.03)
TROPONIN T SERPL-MCNC: <0.01 NG/ML (ref 0–0.03)
UROBILINOGEN UR QL STRIP: NORMAL
WBC NRBC COR # BLD: 7.63 10*3/MM3 (ref 3.4–10.8)
WHOLE BLOOD HOLD COAG: NORMAL
WHOLE BLOOD HOLD SPECIMEN: NORMAL

## 2022-12-14 PROCEDURE — 99284 EMERGENCY DEPT VISIT MOD MDM: CPT

## 2022-12-14 PROCEDURE — 96374 THER/PROPH/DIAG INJ IV PUSH: CPT

## 2022-12-14 PROCEDURE — 74176 CT ABD & PELVIS W/O CONTRAST: CPT | Performed by: RADIOLOGY

## 2022-12-14 PROCEDURE — 85025 COMPLETE CBC W/AUTO DIFF WBC: CPT | Performed by: PHYSICIAN ASSISTANT

## 2022-12-14 PROCEDURE — 74176 CT ABD & PELVIS W/O CONTRAST: CPT

## 2022-12-14 PROCEDURE — 83690 ASSAY OF LIPASE: CPT | Performed by: PHYSICIAN ASSISTANT

## 2022-12-14 PROCEDURE — 36415 COLL VENOUS BLD VENIPUNCTURE: CPT

## 2022-12-14 PROCEDURE — 84484 ASSAY OF TROPONIN QUANT: CPT | Performed by: PHYSICIAN ASSISTANT

## 2022-12-14 PROCEDURE — 81003 URINALYSIS AUTO W/O SCOPE: CPT | Performed by: PHYSICIAN ASSISTANT

## 2022-12-14 PROCEDURE — 80053 COMPREHEN METABOLIC PANEL: CPT | Performed by: PHYSICIAN ASSISTANT

## 2022-12-14 PROCEDURE — 93010 ELECTROCARDIOGRAM REPORT: CPT | Performed by: INTERNAL MEDICINE

## 2022-12-14 PROCEDURE — 86140 C-REACTIVE PROTEIN: CPT | Performed by: PHYSICIAN ASSISTANT

## 2022-12-14 PROCEDURE — 25010000002 HYDRALAZINE PER 20 MG: Performed by: PHYSICIAN ASSISTANT

## 2022-12-14 PROCEDURE — 93005 ELECTROCARDIOGRAM TRACING: CPT | Performed by: PHYSICIAN ASSISTANT

## 2022-12-14 RX ORDER — AMLODIPINE BESYLATE 5 MG/1
10 TABLET ORAL
Status: DISCONTINUED | OUTPATIENT
Start: 2022-12-14 | End: 2022-12-14 | Stop reason: HOSPADM

## 2022-12-14 RX ORDER — VALSARTAN 40 MG/1
50 TABLET ORAL DAILY
COMMUNITY

## 2022-12-14 RX ORDER — HYDRALAZINE HYDROCHLORIDE 20 MG/ML
10 INJECTION INTRAMUSCULAR; INTRAVENOUS ONCE
Status: COMPLETED | OUTPATIENT
Start: 2022-12-14 | End: 2022-12-14

## 2022-12-14 RX ORDER — SODIUM CHLORIDE 0.9 % (FLUSH) 0.9 %
10 SYRINGE (ML) INJECTION AS NEEDED
Status: DISCONTINUED | OUTPATIENT
Start: 2022-12-14 | End: 2022-12-14 | Stop reason: HOSPADM

## 2022-12-14 RX ADMIN — AMLODIPINE BESYLATE 10 MG: 5 TABLET ORAL at 16:25

## 2022-12-14 RX ADMIN — SODIUM CHLORIDE 1000 ML: 9 INJECTION, SOLUTION INTRAVENOUS at 15:16

## 2022-12-14 RX ADMIN — HYDRALAZINE HYDROCHLORIDE 10 MG: 20 INJECTION INTRAMUSCULAR; INTRAVENOUS at 15:17

## 2022-12-14 NOTE — ED PROVIDER NOTES
Subjective   History of Present Illness  57 year old male with past medical hx of anxiety, depression, insomnia, and sleep apnea presents to the ED with abdominal pain for several weeks and became persistent over the past couple of days. Patient states the pain radiates to his pelvic area. Denies dysuria, flank pain, hematuria, fever, nausea, vomiting, or diarrhea. Denies ever having kidney stones or diverticulitis before. Denies any aggravating or alleviating factors. Denies any other complaints or concerns at this time.    History provided by:  Patient   used: No        Review of Systems   Constitutional: Negative.  Negative for fever.   HENT: Negative.    Respiratory: Negative.    Cardiovascular: Negative.  Negative for chest pain.   Gastrointestinal: Positive for abdominal pain.   Endocrine: Negative.    Genitourinary: Negative.  Negative for dysuria.   Skin: Negative.    Neurological: Negative.    Psychiatric/Behavioral: Negative.    All other systems reviewed and are negative.      Past Medical History:   Diagnosis Date   • Anxiety    • Dizziness    • Insomnia    • Sleep apnea        Allergies   Allergen Reactions   • Penicillins Other (See Comments)     Reaction unknown, was advised by family        Past Surgical History:   Procedure Laterality Date   • CARDIAC CATHETERIZATION  2018, 1997    negative findings   • CARDIOVERSION  03/04/2022   • KNEE SURGERY     • SHOULDER SURGERY         Family History   Problem Relation Age of Onset   • Arrhythmia Mother    • Heart disease Father    • Heart attack Father    • Heart attack Maternal Grandfather    • Heart attack Paternal Grandfather        Social History     Socioeconomic History   • Marital status:    Tobacco Use   • Smoking status: Never   • Smokeless tobacco: Never   Vaping Use   • Vaping Use: Never used   Substance and Sexual Activity   • Alcohol use: Yes     Comment: social   • Drug use: Never   • Sexual activity: Defer            Objective   Physical Exam  Vitals and nursing note reviewed.   Constitutional:       General: He is not in acute distress.     Appearance: He is well-developed. He is not diaphoretic.   HENT:      Head: Normocephalic and atraumatic.      Right Ear: External ear normal.      Left Ear: External ear normal.      Nose: Nose normal.   Eyes:      Conjunctiva/sclera: Conjunctivae normal.      Pupils: Pupils are equal, round, and reactive to light.   Neck:      Vascular: No JVD.      Trachea: No tracheal deviation.   Cardiovascular:      Rate and Rhythm: Normal rate and regular rhythm.      Heart sounds: Normal heart sounds. No murmur heard.  Pulmonary:      Effort: Pulmonary effort is normal. No respiratory distress.      Breath sounds: Normal breath sounds. No wheezing.   Abdominal:      General: Bowel sounds are normal.      Palpations: Abdomen is soft.      Tenderness: There is no abdominal tenderness.   Musculoskeletal:         General: No deformity. Normal range of motion.      Cervical back: Normal range of motion and neck supple.   Skin:     General: Skin is warm and dry.      Coloration: Skin is not pale.      Findings: No erythema or rash.   Neurological:      Mental Status: He is alert and oriented to person, place, and time.      Cranial Nerves: No cranial nerve deficit.   Psychiatric:         Behavior: Behavior normal.         Thought Content: Thought content normal.         Procedures           ED Course  ED Course as of 12/14/22 1830   Wed Dec 14, 2022   1537 CT Abdomen Pelvis Without Contrast [TK]   1611 ECG 12 Lead Other; HTN  Vent. Rate :  79 BPM     Atrial Rate :  79 BPM     P-R Int : 172 ms          QRS Dur :  94 ms      QT Int : 410 ms       P-R-T Axes :  42  12  31 degrees     QTc Int : 470 ms     Normal sinus rhythm  Normal ECG  When compared with ECG of 22-MAR-2022 11:08,  ST now depressed in Anterior leads [ES]      ED Course User Index  [ES] Ede Palacios MD  [TK] [place]  Alix MUNROE PA-C                                           [facility]  Number of Diagnoses or Management Options  Bladder wall thickening: new and requires workup  Primary hypertension: new and requires workup     Amount and/or Complexity of Data Reviewed  Clinical lab tests: reviewed and ordered  Tests in the radiology section of CPT®: reviewed and ordered  Tests in the medicine section of CPT®: reviewed and ordered    Risk of Complications, Morbidity, and/or Mortality  Presenting problems: moderate  Diagnostic procedures: moderate  Management options: moderate    Patient Progress  Patient progress: stable      Final diagnoses:   Bladder wall thickening   Primary hypertension       ED Disposition  ED Disposition     ED Disposition   Discharge    Condition   Stable    Comment   --             Ramakrishna Quinn MD  [address] [place] [address]  [phone]    In 2 days      Ab Adam MD  [address] [facility] [address]  [place] [id number] [phone]    In 2 days  bladder wall thickening         Medication List      Changed    losartan 25 MG tablet  Commonly known as: COZAAR  Take 1 tablet by mouth Daily.  What changed:   · how much to take  · when to take this             Alix Núñez PA-C  12/14/22 [id number]

## 2022-12-20 ENCOUNTER — OFFICE VISIT (OUTPATIENT)
Dept: UROLOGY | Facility: CLINIC | Age: 57
End: 2022-12-20
Payer: COMMERCIAL

## 2022-12-20 VITALS
DIASTOLIC BLOOD PRESSURE: 97 MMHG | WEIGHT: 242 LBS | SYSTOLIC BLOOD PRESSURE: 175 MMHG | HEART RATE: 73 BPM | HEIGHT: 72 IN | OXYGEN SATURATION: 98 % | BODY MASS INDEX: 32.78 KG/M2

## 2022-12-20 DIAGNOSIS — N32.89 BLADDER WALL THICKENING: Primary | ICD-10-CM

## 2022-12-20 PROCEDURE — 99203 OFFICE O/P NEW LOW 30 MIN: CPT | Performed by: UROLOGY

## 2022-12-20 NOTE — PROGRESS NOTES
Chief Complaint:      Chief Complaint   Patient presents with   • Bladder wall thickening       HPI:   57 y.o. male referred for bladder wall thickening.  He has significant frequency.  Abdominal pain CTs x2 his bladder is definitely thick-walled he needs cystoscopy    Past Medical History:     Past Medical History:   Diagnosis Date   • Anxiety    • Dizziness    • Insomnia    • Sleep apnea        Current Meds:     Current Outpatient Medications   Medication Sig Dispense Refill   • aspirin 81 MG chewable tablet Chew 81 mg Daily. OTC     • flecainide (TAMBOCOR) 50 MG tablet Take 1 tablet by mouth Every 12 (Twelve) Hours. 180 tablet 3   • LORazepam (ATIVAN) 1 MG tablet Take 1 mg by mouth 3 (Three) Times a Day As Needed. for anxiety     • losartan (COZAAR) 25 MG tablet Take 1 tablet by mouth Daily. (Patient taking differently: Take 50 mg by mouth Daily.) 90 tablet 3   • metoprolol succinate XL (TOPROL-XL) 50 MG 24 hr tablet Take 50 mg by mouth Daily.     • multivitamin with minerals tablet tablet Take 1 tablet by mouth Daily. OTC     • nitroglycerin (NITROSTAT) 0.4 MG SL tablet Place 1 tablet under the tongue Every 5 (Five) Minutes As Needed (chest pain). Take no more than 3 doses in 15 minutes. 25 tablet 3   • sertraline (ZOLOFT) 50 MG tablet Take 50 mg by mouth Daily.     • valsartan (DIOVAN) 40 MG tablet Take 50 mg by mouth Daily.     • Zinc 50 MG tablet Take 50 mg by mouth Daily. OTC       No current facility-administered medications for this visit.        Allergies:      Allergies   Allergen Reactions   • Penicillins Other (See Comments)     Reaction unknown, was advised by family         Past Surgical History:     Past Surgical History:   Procedure Laterality Date   • CARDIAC CATHETERIZATION  2018, 1997    negative findings   • CARDIOVERSION  03/04/2022   • KNEE SURGERY     • SHOULDER SURGERY         Social History:     Social History     Socioeconomic History   • Marital status:    Tobacco Use   • Smoking  status: Never   • Smokeless tobacco: Never   Vaping Use   • Vaping Use: Never used   Substance and Sexual Activity   • Alcohol use: Yes     Comment: social   • Drug use: Never   • Sexual activity: Defer       Family History:     Family History   Problem Relation Age of Onset   • Arrhythmia Mother    • Heart disease Father    • Heart attack Father    • Heart attack Maternal Grandfather    • Heart attack Paternal Grandfather        Review of Systems:     Review of Systems   Constitutional: Negative.    HENT: Negative.    Eyes: Negative.    Respiratory: Negative.    Cardiovascular: Negative.    Gastrointestinal: Negative.    Endocrine: Negative.    Musculoskeletal: Negative.    Allergic/Immunologic: Negative.    Neurological: Negative.    Hematological: Negative.    Psychiatric/Behavioral: Negative.        Physical Exam:     Physical Exam  Vitals and nursing note reviewed.   Constitutional:       Appearance: He is well-developed.   HENT:      Head: Normocephalic and atraumatic.   Eyes:      Conjunctiva/sclera: Conjunctivae normal.      Pupils: Pupils are equal, round, and reactive to light.   Cardiovascular:      Rate and Rhythm: Normal rate and regular rhythm.      Heart sounds: Normal heart sounds.   Pulmonary:      Effort: Pulmonary effort is normal.      Breath sounds: Normal breath sounds.   Abdominal:      General: Bowel sounds are normal.      Palpations: Abdomen is soft.   Musculoskeletal:         General: Normal range of motion.      Cervical back: Normal range of motion.   Skin:     General: Skin is warm and dry.   Neurological:      Mental Status: He is alert and oriented to person, place, and time.      Deep Tendon Reflexes: Reflexes are normal and symmetric.   Psychiatric:         Behavior: Behavior normal.         Thought Content: Thought content normal.         Judgment: Judgment normal.         I have reviewed the following portions of the patient's history: Allergies, current medications, past family  history, past medical history, past social history, past surgical history, problem list, and ROS and confirm it is accurate.    Recent Image (CT and/or KUB):      CT Abdomen and Pelvis: No results found for this or any previous visit.       CT Stone Protocol: No results found for this or any previous visit.       KUB: No results found for this or any previous visit.       Labs (past 3 months):      Admission on 12/14/2022, Discharged on 12/14/2022   Component Date Value Ref Range Status   • Glucose 12/14/2022 99  65 - 99 mg/dL Final   • BUN 12/14/2022 12  6 - 20 mg/dL Final   • Creatinine 12/14/2022 0.87  0.76 - 1.27 mg/dL Final   • Sodium 12/14/2022 137  136 - 145 mmol/L Final   • Potassium 12/14/2022 4.2  3.5 - 5.2 mmol/L Final   • Chloride 12/14/2022 104  98 - 107 mmol/L Final   • CO2 12/14/2022 22.3  22.0 - 29.0 mmol/L Final   • Calcium 12/14/2022 9.2  8.6 - 10.5 mg/dL Final   • Total Protein 12/14/2022 7.1  6.0 - 8.5 g/dL Final   • Albumin 12/14/2022 4.40  3.50 - 5.20 g/dL Final   • ALT (SGPT) 12/14/2022 37  1 - 41 U/L Final   • AST (SGOT) 12/14/2022 24  1 - 40 U/L Final   • Alkaline Phosphatase 12/14/2022 76  39 - 117 U/L Final   • Total Bilirubin 12/14/2022 0.5  0.0 - 1.2 mg/dL Final   • Globulin 12/14/2022 2.7  gm/dL Final   • A/G Ratio 12/14/2022 1.6  g/dL Final   • BUN/Creatinine Ratio 12/14/2022 13.8  7.0 - 25.0 Final   • Anion Gap 12/14/2022 10.7  5.0 - 15.0 mmol/L Final   • eGFR 12/14/2022 100.0  >60.0 mL/min/1.73 Final    National Kidney Foundation and American Society of Nephrology (ASN) Task Force recommended calculation based on the Chronic Kidney Disease Epidemiology Collaboration (CKD-EPI) equation refit without adjustment for race.   • C-Reactive Protein 12/14/2022 <0.30  0.00 - 0.50 mg/dL Final   • Lipase 12/14/2022 21  13 - 60 U/L Final   • Color, UA 12/14/2022 Yellow  Yellow, Straw Final   • Appearance, UA 12/14/2022 Clear  Clear Final   • pH, UA 12/14/2022 6.5  5.0 - 8.0 Final   • Specific  Gravity, UA 12/14/2022 1.008  1.005 - 1.030 Final   • Glucose, UA 12/14/2022 Negative  Negative Final   • Ketones, UA 12/14/2022 Negative  Negative Final   • Bilirubin, UA 12/14/2022 Negative  Negative Final   • Blood, UA 12/14/2022 Negative  Negative Final   • Protein, UA 12/14/2022 Negative  Negative Final   • Leuk Esterase, UA 12/14/2022 Negative  Negative Final   • Nitrite, UA 12/14/2022 Negative  Negative Final   • Urobilinogen, UA 12/14/2022 0.2 E.U./dL  0.2 - 1.0 E.U./dL Final   • Extra Tube 12/14/2022 Hold for add-ons.   Final    Auto resulted.   • Extra Tube 12/14/2022 hold for add-on   Final    Auto resulted   • Extra Tube 12/14/2022 Hold for add-ons.   Final    Auto resulted.   • Extra Tube 12/14/2022 Hold for add-ons.   Final    Auto resulted   • WBC 12/14/2022 7.63  3.40 - 10.80 10*3/mm3 Final   • RBC 12/14/2022 5.12  4.14 - 5.80 10*6/mm3 Final   • Hemoglobin 12/14/2022 15.8  13.0 - 17.7 g/dL Final   • Hematocrit 12/14/2022 45.4  37.5 - 51.0 % Final   • MCV 12/14/2022 88.7  79.0 - 97.0 fL Final   • MCH 12/14/2022 30.9  26.6 - 33.0 pg Final   • MCHC 12/14/2022 34.8  31.5 - 35.7 g/dL Final   • RDW 12/14/2022 13.0  12.3 - 15.4 % Final   • RDW-SD 12/14/2022 41.9  37.0 - 54.0 fl Final   • MPV 12/14/2022 9.0  6.0 - 12.0 fL Final   • Platelets 12/14/2022 171  140 - 450 10*3/mm3 Final   • Neutrophil % 12/14/2022 71.5  42.7 - 76.0 % Final   • Lymphocyte % 12/14/2022 20.3  19.6 - 45.3 % Final   • Monocyte % 12/14/2022 6.4  5.0 - 12.0 % Final   • Eosinophil % 12/14/2022 0.8  0.3 - 6.2 % Final   • Basophil % 12/14/2022 0.7  0.0 - 1.5 % Final   • Immature Grans % 12/14/2022 0.3  0.0 - 0.5 % Final   • Neutrophils, Absolute 12/14/2022 5.46  1.70 - 7.00 10*3/mm3 Final   • Lymphocytes, Absolute 12/14/2022 1.55  0.70 - 3.10 10*3/mm3 Final   • Monocytes, Absolute 12/14/2022 0.49  0.10 - 0.90 10*3/mm3 Final   • Eosinophils, Absolute 12/14/2022 0.06  0.00 - 0.40 10*3/mm3 Final   • Basophils, Absolute 12/14/2022 0.05  0.00 -  0.20 10*3/mm3 Final   • Immature Grans, Absolute 12/14/2022 0.02  0.00 - 0.05 10*3/mm3 Final   • nRBC 12/14/2022 0.0  0.0 - 0.2 /100 WBC Final   • QT Interval 12/14/2022 410  ms Final   • QTC Interval 12/14/2022 470  ms Final   • Troponin T 12/14/2022 <0.010  0.000 - 0.030 ng/mL Final   • Troponin T 12/14/2022 <0.010  0.000 - 0.030 ng/mL Final   Hospital Outpatient Visit on 12/02/2022   Component Date Value Ref Range Status   • Creatinine 12/02/2022 0.90  0.60 - 1.30 mg/dL Final    Serial Number: [id number]  [id number]        Procedure:       Assessment/Plan:   Bladder wall thickening-reviewed CT x2 negative upper tracts we will set him up for cystoscopy            This document has been electronically signed by NIKIK SMITH MD December 20, 2022 14:50 EST    Dictated Utilizing Dragon Dictation: Part of this note may be an electronic transcription/translation of spoken language to printed text using the Dragon Dictation System.

## 2023-01-07 PROBLEM — N32.89 BLADDER WALL THICKENING: Status: ACTIVE | Noted: 2023-01-07

## 2023-07-17 PROBLEM — F41.9 ANXIETY: Status: ACTIVE | Noted: 2018-09-25

## 2023-07-17 PROBLEM — R73.03 PREDIABETES: Status: ACTIVE | Noted: 2021-03-29

## 2023-07-17 PROBLEM — I25.10 NONOBSTRUCTIVE ATHEROSCLEROSIS OF CORONARY ARTERY: Status: ACTIVE | Noted: 2022-10-25

## 2023-07-17 PROBLEM — G47.33 OBSTRUCTIVE SLEEP APNEA SYNDROME: Status: ACTIVE | Noted: 2018-09-25

## 2023-07-17 PROBLEM — I10 ESSENTIAL HYPERTENSION: Status: ACTIVE | Noted: 2018-09-25

## 2023-07-17 PROBLEM — E78.5 DYSLIPIDEMIA: Status: ACTIVE | Noted: 2018-09-25

## 2023-07-17 PROBLEM — E03.9 HYPOTHYROIDISM: Status: ACTIVE | Noted: 2018-09-25

## 2023-07-17 PROBLEM — R00.0 TACHYCARDIA: Status: ACTIVE | Noted: 2021-03-29

## 2023-07-17 PROBLEM — I48.91 ATRIAL FIBRILLATION: Status: ACTIVE | Noted: 2018-09-25

## 2023-07-17 PROBLEM — E66.9 OBESITY: Status: ACTIVE | Noted: 2022-09-21

## 2023-07-17 PROBLEM — K21.00 GASTRO-ESOPHAGEAL REFLUX DISEASE WITH ESOPHAGITIS: Status: ACTIVE | Noted: 2018-09-25

## 2023-07-17 PROBLEM — I20.0 PREINFARCTION SYNDROME: Status: ACTIVE | Noted: 2018-09-25

## 2023-08-28 ENCOUNTER — PROCEDURE VISIT (OUTPATIENT)
Dept: UROLOGY | Facility: CLINIC | Age: 58
End: 2023-08-28
Payer: COMMERCIAL

## 2023-08-28 VITALS
BODY MASS INDEX: 31.99 KG/M2 | SYSTOLIC BLOOD PRESSURE: 159 MMHG | WEIGHT: 236.2 LBS | HEART RATE: 94 BPM | DIASTOLIC BLOOD PRESSURE: 99 MMHG | HEIGHT: 72 IN

## 2023-08-28 DIAGNOSIS — Z48.816 AFTERCARE FOLLOWING SURGERY OF THE GENITOURINARY SYSTEM: Primary | ICD-10-CM

## 2023-08-28 DIAGNOSIS — N32.89 BLADDER WALL THICKENING: ICD-10-CM

## 2023-08-28 RX ORDER — GENTAMICIN SULFATE 40 MG/ML
80 INJECTION, SOLUTION INTRAMUSCULAR; INTRAVENOUS ONCE
Status: COMPLETED | OUTPATIENT
Start: 2023-08-28 | End: 2023-08-28

## 2023-08-28 RX ADMIN — GENTAMICIN SULFATE 80 MG: 40 INJECTION, SOLUTION INTRAMUSCULAR; INTRAVENOUS at 13:55

## 2023-08-28 NOTE — PROGRESS NOTES
Chief Complaint:      Chief Complaint   Patient presents with    bladder wall thickening        HPI:   58 y.o. male originally referred for bladder wall thickening. He has significant frequency. Abdominal pain CTs x2 his bladder is definitely thick-walled he is here for cystoscopy  Past Medical History:     Past Medical History:   Diagnosis Date    Anxiety     Depression     Dizziness     Insomnia     Sleep apnea        Current Meds:     Current Outpatient Medications   Medication Sig Dispense Refill    dilTIAZem (CARDIZEM) 120 MG tablet Take 1 tablet by mouth 2 (Two) Times a Day.      levothyroxine (SYNTHROID, LEVOTHROID) 50 MCG tablet Take 1 tablet by mouth 2 (Two) Times a Day.      LORazepam (ATIVAN) 1 MG tablet Take 1 tablet by mouth 3 (Three) Times a Day As Needed. for anxiety      multivitamin with minerals tablet tablet Take 1 tablet by mouth Daily. OTC      nitroglycerin (NITROSTAT) 0.4 MG SL tablet Place 1 tablet under the tongue Every 5 (Five) Minutes As Needed (chest pain). Take no more than 3 doses in 15 minutes. 25 tablet 3    omeprazole (priLOSEC) 20 MG capsule Take 1 capsule by mouth Daily.      rosuvastatin (CRESTOR) 10 MG tablet Take 1 tablet by mouth Daily.      sertraline (ZOLOFT) 100 MG tablet Take 1 tablet by mouth Daily for 60 days. 60 tablet 0    valsartan (DIOVAN) 40 MG tablet Take 4 tablets by mouth 2 (Two) Times a Day.      zolpidem (AMBIEN) 5 MG tablet Take 2 tablets by mouth Every Night.       No current facility-administered medications for this visit.        Allergies:      Allergies   Allergen Reactions    Penicillins Other (See Comments)     Reaction unknown, was advised by family         Past Surgical History:     Past Surgical History:   Procedure Laterality Date    CARDIAC CATHETERIZATION  2018, 1997    negative findings    CARDIOVERSION  03/04/2022    KNEE SURGERY      SHOULDER SURGERY         Social History:     Social History     Socioeconomic History    Marital status:     Tobacco Use    Smoking status: Never    Smokeless tobacco: Never   Vaping Use    Vaping Use: Never used   Substance and Sexual Activity    Alcohol use: Not Currently     Alcohol/week: 35.0 standard drinks     Types: 35 Cans of beer per week    Drug use: Never    Sexual activity: Yes     Partners: Female       Family History:     Family History   Problem Relation Age of Onset    Depression Mother     Anxiety disorder Mother     Arrhythmia Mother     Heart disease Father     Heart attack Father     Heart attack Maternal Grandfather     Heart attack Paternal Grandfather     Heart attack Paternal Uncle        Review of Systems:     Review of Systems   Constitutional: Negative.    HENT: Negative.     Eyes: Negative.    Respiratory: Negative.     Cardiovascular: Negative.    Gastrointestinal: Negative.    Endocrine: Negative.    Musculoskeletal: Negative.    Allergic/Immunologic: Negative.    Neurological: Negative.    Hematological: Negative.    Psychiatric/Behavioral: Negative.       Physical Exam:     Physical Exam  Vitals and nursing note reviewed.   Constitutional:       Appearance: He is well-developed.   HENT:      Head: Normocephalic and atraumatic.   Eyes:      Conjunctiva/sclera: Conjunctivae normal.      Pupils: Pupils are equal, round, and reactive to light.   Cardiovascular:      Rate and Rhythm: Normal rate and regular rhythm.      Heart sounds: Normal heart sounds.   Pulmonary:      Effort: Pulmonary effort is normal.      Breath sounds: Normal breath sounds.   Abdominal:      General: Bowel sounds are normal.      Palpations: Abdomen is soft.   Genitourinary:     Penis: Normal.       Testes: Normal.   Musculoskeletal:         General: Normal range of motion.      Cervical back: Normal range of motion.   Skin:     General: Skin is warm and dry.   Neurological:      Mental Status: He is alert and oriented to person, place, and time.      Deep Tendon Reflexes: Reflexes are normal and symmetric.    Psychiatric:         Behavior: Behavior normal.         Thought Content: Thought content normal.         Judgment: Judgment normal.       I have reviewed the following portions of the patient's history: Allergies, current medications, past family history, past medical history, past social history, past surgical history, problem list, and ROS and confirm it is accurate.    Recent Image (CT and/or KUB):      CT Abdomen and Pelvis: No results found for this or any previous visit.       CT Stone Protocol: No results found for this or any previous visit.       KUB: No results found for this or any previous visit.       Labs (past 3 months):      No visits with results within 3 Month(s) from this visit.   Latest known visit with results is:   Admission on 12/14/2022, Discharged on 12/14/2022   Component Date Value Ref Range Status    Glucose 12/14/2022 99  65 - 99 mg/dL Final    BUN 12/14/2022 12  6 - 20 mg/dL Final    Creatinine 12/14/2022 0.87  0.76 - 1.27 mg/dL Final    Sodium 12/14/2022 137  136 - 145 mmol/L Final    Potassium 12/14/2022 4.2  3.5 - 5.2 mmol/L Final    Chloride 12/14/2022 104  98 - 107 mmol/L Final    CO2 12/14/2022 22.3  22.0 - 29.0 mmol/L Final    Calcium 12/14/2022 9.2  8.6 - 10.5 mg/dL Final    Total Protein 12/14/2022 7.1  6.0 - 8.5 g/dL Final    Albumin 12/14/2022 4.40  3.50 - 5.20 g/dL Final    ALT (SGPT) 12/14/2022 37  1 - 41 U/L Final    AST (SGOT) 12/14/2022 24  1 - 40 U/L Final    Alkaline Phosphatase 12/14/2022 76  39 - 117 U/L Final    Total Bilirubin 12/14/2022 0.5  0.0 - 1.2 mg/dL Final    Globulin 12/14/2022 2.7  gm/dL Final    A/G Ratio 12/14/2022 1.6  g/dL Final    BUN/Creatinine Ratio 12/14/2022 13.8  7.0 - 25.0 Final    Anion Gap 12/14/2022 10.7  5.0 - 15.0 mmol/L Final    eGFR 12/14/2022 100.0  >60.0 mL/min/1.73 Final    National Kidney Foundation and American Society of Nephrology (ASN) Task Force recommended calculation based on the Chronic Kidney Disease Epidemiology  Collaboration (CKD-EPI) equation refit without adjustment for race.    C-Reactive Protein 12/14/2022 <0.30  0.00 - 0.50 mg/dL Final    Lipase 12/14/2022 21  13 - 60 U/L Final    Color, UA 12/14/2022 Yellow  Yellow, Straw Final    Appearance, UA 12/14/2022 Clear  Clear Final    pH, UA 12/14/2022 6.5  5.0 - 8.0 Final    Specific Gravity, UA 12/14/2022 1.008  1.005 - 1.030 Final    Glucose, UA 12/14/2022 Negative  Negative Final    Ketones, UA 12/14/2022 Negative  Negative Final    Bilirubin, UA 12/14/2022 Negative  Negative Final    Blood, UA 12/14/2022 Negative  Negative Final    Protein, UA 12/14/2022 Negative  Negative Final    Leuk Esterase, UA 12/14/2022 Negative  Negative Final    Nitrite, UA 12/14/2022 Negative  Negative Final    Urobilinogen, UA 12/14/2022 0.2 E.U./dL  0.2 - 1.0 E.U./dL Final    Extra Tube 12/14/2022 Hold for add-ons.   Final    Auto resulted.    Extra Tube 12/14/2022 hold for add-on   Final    Auto resulted    Extra Tube 12/14/2022 Hold for add-ons.   Final    Auto resulted.    Extra Tube 12/14/2022 Hold for add-ons.   Final    Auto resulted    WBC 12/14/2022 7.63  3.40 - 10.80 10*3/mm3 Final    RBC 12/14/2022 5.12  4.14 - 5.80 10*6/mm3 Final    Hemoglobin 12/14/2022 15.8  13.0 - 17.7 g/dL Final    Hematocrit 12/14/2022 45.4  37.5 - 51.0 % Final    MCV 12/14/2022 88.7  79.0 - 97.0 fL Final    MCH 12/14/2022 30.9  26.6 - 33.0 pg Final    MCHC 12/14/2022 34.8  31.5 - 35.7 g/dL Final    RDW 12/14/2022 13.0  12.3 - 15.4 % Final    RDW-SD 12/14/2022 41.9  37.0 - 54.0 fl Final    MPV 12/14/2022 9.0  6.0 - 12.0 fL Final    Platelets 12/14/2022 171  140 - 450 10*3/mm3 Final    Neutrophil % 12/14/2022 71.5  42.7 - 76.0 % Final    Lymphocyte % 12/14/2022 20.3  19.6 - 45.3 % Final    Monocyte % 12/14/2022 6.4  5.0 - 12.0 % Final    Eosinophil % 12/14/2022 0.8  0.3 - 6.2 % Final    Basophil % 12/14/2022 0.7  0.0 - 1.5 % Final    Immature Grans % 12/14/2022 0.3  0.0 - 0.5 % Final    Neutrophils, Absolute  12/14/2022 5.46  1.70 - 7.00 10*3/mm3 Final    Lymphocytes, Absolute 12/14/2022 1.55  0.70 - 3.10 10*3/mm3 Final    Monocytes, Absolute 12/14/2022 0.49  0.10 - 0.90 10*3/mm3 Final    Eosinophils, Absolute 12/14/2022 0.06  0.00 - 0.40 10*3/mm3 Final    Basophils, Absolute 12/14/2022 0.05  0.00 - 0.20 10*3/mm3 Final    Immature Grans, Absolute 12/14/2022 0.02  0.00 - 0.05 10*3/mm3 Final    nRBC 12/14/2022 0.0  0.0 - 0.2 /100 WBC Final    QT Interval 12/14/2022 410  ms Final    QTC Interval 12/14/2022 470  ms Final    Troponin T 12/14/2022 <0.010  0.000 - 0.030 ng/mL Final    Troponin T 12/14/2022 <0.010  0.000 - 0.030 ng/mL Final        Procedure:   Cystoscopy:  Patient presents today for cystourethroscopy.  I went ahead and obtained an informed consent including the risks of anesthesia, bleeding, infection, etc.  After prepping and draping in a sterile fashion in the low dorsal lithotomy position, the urethra was gently anesthetized with 10 mL of 2% viscous Xylocaine jelly.  After an appropriate period of topical anesthesia, I used the Olympus digital 14 French flexible cystoscope to examine the anterior urethra which was completely normal.  The ureteral orifices were visualized and normal in position and configuration. There were no stones, tumors, or foreign bodies.  The blue light was enabled and was negative allowing us to see small mucosal lesions. The patient was given 80 mg of gentamicin in an intramuscular fashion as prophylaxis for the cystoscopy and released from the clinic.    Assessment/Plan:   Thick-walled bladder otherwise unremarkable cystoscopy we will see back on an as-needed basis.          This document has been electronically signed by NIKKI SMITH MD August 28, 2023 13:35 EDT    Dictated Utilizing Dragon Dictation: Part of this note may be an electronic transcription/translation of spoken language to printed text using the Dragon Dictation System.

## 2023-08-29 ENCOUNTER — OFFICE VISIT (OUTPATIENT)
Dept: NEUROSURGERY | Facility: CLINIC | Age: 58
End: 2023-08-29
Payer: COMMERCIAL

## 2023-08-29 VITALS — WEIGHT: 234.2 LBS | HEIGHT: 72 IN | BODY MASS INDEX: 31.72 KG/M2 | TEMPERATURE: 98.3 F

## 2023-08-29 DIAGNOSIS — M47.819 FACET ARTHROPATHY: ICD-10-CM

## 2023-08-29 DIAGNOSIS — M51.36 DDD (DEGENERATIVE DISC DISEASE), LUMBAR: ICD-10-CM

## 2023-08-29 DIAGNOSIS — M54.9 MECHANICAL BACK PAIN: Primary | ICD-10-CM

## 2023-08-29 DIAGNOSIS — M54.16 LUMBAR RADICULOPATHY: ICD-10-CM

## 2023-08-29 PROCEDURE — 99204 OFFICE O/P NEW MOD 45 MIN: CPT | Performed by: NEUROLOGICAL SURGERY

## 2023-08-29 RX ORDER — GABAPENTIN 300 MG/1
CAPSULE ORAL
Qty: 90 CAPSULE | Refills: 1 | Status: SHIPPED | OUTPATIENT
Start: 2023-08-29

## 2023-08-29 NOTE — PROGRESS NOTES
Patient: Asim Priest  : 1965    Primary Care Provider: Ramakrishna Quinn MD    Requesting Provider: As above        History    Chief Complaint: Low back and left thigh pain.    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  Some of that let up but he has had bothersome pain extending into the left anterior thigh and wrapping around the knee.  He has some weakness in his left leg.  Numbness extending all the way to his toes is gone away.  Physical therapy and an inversion table have been helpful.  He is worse with driving or walking.  Fortunately his pain is improved considerably.  He is also done some chiropractic.    Review of Systems   Constitutional:  Negative for activity change, appetite change, chills, diaphoresis, fatigue, fever and unexpected weight change.   HENT:  Negative for congestion, dental problem, drooling, ear discharge, ear pain, facial swelling, hearing loss, mouth sores, nosebleeds, postnasal drip, rhinorrhea, sinus pressure, sneezing, sore throat, tinnitus, trouble swallowing and voice change.    Eyes:  Negative for photophobia, pain, discharge, redness, itching and visual disturbance.   Respiratory:  Negative for apnea, cough, choking, chest tightness, shortness of breath, wheezing and stridor.    Cardiovascular:  Negative for chest pain, palpitations and leg swelling.   Gastrointestinal:  Negative for abdominal distention, abdominal pain, anal bleeding, blood in stool, constipation, diarrhea, nausea, rectal pain and vomiting.   Endocrine: Negative for cold intolerance, heat intolerance, polydipsia, polyphagia and polyuria.   Genitourinary:  Negative for decreased urine volume, difficulty urinating, dysuria, enuresis, flank pain, frequency, genital sores, hematuria and urgency.   Musculoskeletal:   "Negative for arthralgias, back pain, gait problem, joint swelling, myalgias, neck pain and neck stiffness.   Skin:  Negative for color change, pallor, rash and wound.   Allergic/Immunologic: Negative for environmental allergies, food allergies and immunocompromised state.   Neurological:  Negative for dizziness, tremors, seizures, syncope, facial asymmetry, speech difficulty, weakness, light-headedness, numbness and headaches.   Hematological:  Negative for adenopathy. Does not bruise/bleed easily.   Psychiatric/Behavioral:  Negative for agitation, behavioral problems, confusion, decreased concentration, dysphoric mood, hallucinations, self-injury, sleep disturbance and suicidal ideas. The patient is not nervous/anxious and is not hyperactive.    All other systems reviewed and are negative.    The patient's past medical history, past surgical history, family history, and social history have been reviewed at length in the electronic medical record.      Physical Exam:   Temp 98.3 øF (36.8 øC) (Infrared)   Ht 182.9 cm (72\")   Wt 106 kg (234 lb 3.2 oz)   BMI 31.76 kg/mý   CONSTITUTIONAL: Patient is well-nourished, pleasant and appears stated age.  MUSCULOSKELETAL:  Straight leg raising is negative.  Bassam's Sign is negative.  ROM in the low back is normal.  Tenderness in the back to palpation is not observed.  NEUROLOGICAL:  Orientation, memory, attention span, language function, and cognition have been examined and are intact.  Strength is intact in the lower extremities to direct testing except for left leg extension which is 4/5.  Muscle tone is normal throughout.  Station and gait are normal.  Sensation is intact to light touch testing throughout.  Deep tendon reflexes are 1+ and symmetrical.  The left knee reflex may be slightly diminished.  Coordination is intact.      Medical Decision Making    Data Review:   (All imaging studies were personally reviewed unless stated otherwise)  MRI of the lumbar spine " dated 6/22/2023 demonstrates some disc bulging at degenerative disc disease as well as some facet arthropathy.  High-grade root or canal compromise is not observed.  The radiology report talks about a 1 to 2 mm caudally directed L2-3 central disc herniation.  I am not particularly impressed with that.    Diagnosis:   Lumbar radiculopathy, improving.    Treatment Options:   The patient will continue physical therapy.  I have prescribed gabapentin.  He will follow-up in my clinic in several weeks.  If he continues to struggle then we might consider an epidural injection.  He does have significant cardiac comorbidities.       Diagnosis Plan   1. Mechanical back pain        2. Lumbar radiculopathy        3. DDD (degenerative disc disease), lumbar        4. Facet arthropathy            Scribed for Ranulfo Mccauley MD by Jessika Doyle PHUONG 8/29/2023 11:09 EDT      I, Dr. Mccauley, personally performed the services described in the documentation, as scribed in my presence, and it is both accurate and complete.

## 2023-09-16 DIAGNOSIS — F33.1 MODERATE EPISODE OF RECURRENT MAJOR DEPRESSIVE DISORDER: ICD-10-CM

## 2023-09-16 DIAGNOSIS — F41.1 GENERALIZED ANXIETY DISORDER: ICD-10-CM

## 2023-09-18 RX ORDER — SERTRALINE HYDROCHLORIDE 100 MG/1
TABLET, FILM COATED ORAL
Qty: 90 TABLET | Refills: 0 | Status: SHIPPED | OUTPATIENT
Start: 2023-09-18

## 2023-11-14 ENCOUNTER — OFFICE VISIT (OUTPATIENT)
Dept: NEUROSURGERY | Facility: CLINIC | Age: 58
End: 2023-11-14
Payer: COMMERCIAL

## 2023-11-14 VITALS — WEIGHT: 242 LBS | BODY MASS INDEX: 32.78 KG/M2 | HEIGHT: 72 IN | TEMPERATURE: 97.8 F | RESPIRATION RATE: 18 BRPM

## 2023-11-14 DIAGNOSIS — M54.16 LUMBAR RADICULOPATHY: Primary | ICD-10-CM

## 2023-11-14 DIAGNOSIS — M51.36 DDD (DEGENERATIVE DISC DISEASE), LUMBAR: ICD-10-CM

## 2023-11-14 PROCEDURE — 99213 OFFICE O/P EST LOW 20 MIN: CPT | Performed by: NEUROLOGICAL SURGERY

## 2023-11-14 NOTE — PROGRESS NOTES
Patient: Asim Priest  : 1965    Primary Care Provider: Ramakrishna Quinn MD    Requesting Provider: As above        History    Chief Complaint: Low back and left thigh pain with weakness.    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  Some of that let up but he has had bothersome pain extending into the left anterior thigh and wrapping around the knee.  He has some weakness in his left leg.  Numbness extending all the way to his toes is gone away.  Physical therapy and an inversion table have been helpful.  He has taken the gabapentin but is not sure it is working.  He still has some pain but his major complaint today is increasing weakness in the left lower extremity.  Sometimes the leg will give out on him.    Review of Systems   Constitutional:  Negative for activity change, appetite change, chills, diaphoresis, fatigue, fever and unexpected weight change.   HENT:  Negative for congestion, dental problem, drooling, ear discharge, ear pain, facial swelling, hearing loss, mouth sores, nosebleeds, postnasal drip, rhinorrhea, sinus pressure, sneezing, sore throat, tinnitus, trouble swallowing and voice change.    Eyes:  Negative for photophobia, pain, discharge, redness, itching and visual disturbance.   Respiratory:  Negative for apnea, cough, choking, chest tightness, shortness of breath, wheezing and stridor.    Cardiovascular:  Negative for chest pain, palpitations and leg swelling.   Gastrointestinal:  Negative for abdominal distention, abdominal pain, anal bleeding, blood in stool, constipation, diarrhea, nausea, rectal pain and vomiting.   Endocrine: Negative for cold intolerance, heat intolerance, polydipsia, polyphagia and polyuria.   Genitourinary:  Negative for decreased urine volume, difficulty urinating,  "dysuria, enuresis, flank pain, frequency, genital sores, hematuria and urgency.   Musculoskeletal:  Positive for arthralgias and back pain. Negative for gait problem, joint swelling, myalgias, neck pain and neck stiffness.   Skin:  Negative for color change, pallor, rash and wound.   Allergic/Immunologic: Negative for environmental allergies, food allergies and immunocompromised state.   Neurological:  Positive for numbness. Negative for dizziness, tremors, seizures, syncope, facial asymmetry, speech difficulty, weakness, light-headedness and headaches.   Hematological:  Negative for adenopathy. Does not bruise/bleed easily.   Psychiatric/Behavioral:  Negative for agitation, behavioral problems, confusion, decreased concentration, dysphoric mood, hallucinations, self-injury, sleep disturbance and suicidal ideas. The patient is not nervous/anxious and is not hyperactive.    All other systems reviewed and are negative.      The patient's past medical history, past surgical history, family history, and social history have been reviewed at length in the electronic medical record.      Physical Exam:   Temp 97.8 °F (36.6 °C) (Infrared)   Resp 18   Ht 182.9 cm (72\")   Wt 110 kg (242 lb)   BMI 32.82 kg/m²   Left leg extension and hip flexors are approximately 4/5.  Other groups are intact.  Straight leg raising is negative.    Medical Decision Making    Data Review:   (All imaging studies were personally reviewed unless stated otherwise)  MRI of the lumbar spine dated 6/22/2023 demonstrates some disc bulging at degenerative disc disease as well as some facet arthropathy.  High-grade root or canal compromise is not observed.  The radiology report talks about a 1 to 2 mm caudally directed L2-3 central disc herniation.  I am not particularly impressed with that.       Diagnosis:   Lumbar radiculopathy, ongoing.    Treatment Options:   Given his increased weakness I am going to make arrangements for electrodiagnostic " studies of his left lower extremity as well as a lumbar CT myelogram.  Further recommendations will ensue.  Once again he has a number of significant cardiac comorbidities.      Scribed for Ranulfo Mccauley MD by Shaunna Mandel CMA on 11/14/2023 12:21 EST       I, Dr. Mccauley, personally performed the services described in the documentation, as scribed in my presence, and it is both accurate and complete.

## 2024-01-08 ENCOUNTER — TELEPHONE (OUTPATIENT)
Dept: INFUSION THERAPY | Facility: HOSPITAL | Age: 59
End: 2024-01-08
Payer: COMMERCIAL

## 2024-01-08 NOTE — TELEPHONE ENCOUNTER
Contacted patient as pre-procedure call prior to planned myelogram scheduled for 1/9/24. Reviewed with patient arrival time, location,  needed, nothing to eat after midnight but clear liquids okay, may take medications the morning of the procedure. Plan on being here for procedure 4-5 hours so where comfortable clothes. Reviewed medical history, medications, allergies and allowed time for questions.

## 2024-01-09 ENCOUNTER — HOSPITAL ENCOUNTER (OUTPATIENT)
Dept: GENERAL RADIOLOGY | Facility: HOSPITAL | Age: 59
Discharge: HOME | End: 2024-01-09
Payer: COMMERCIAL

## 2024-01-09 ENCOUNTER — HOSPITAL ENCOUNTER (OUTPATIENT)
Dept: NEUROLOGY | Facility: HOSPITAL | Age: 59
Discharge: HOME | End: 2024-01-09
Payer: COMMERCIAL

## 2024-01-09 ENCOUNTER — HOSPITAL ENCOUNTER (OUTPATIENT)
Dept: CT IMAGING | Facility: HOSPITAL | Age: 59
Discharge: HOME | End: 2024-01-09
Payer: COMMERCIAL

## 2024-01-09 VITALS
TEMPERATURE: 97.6 F | HEART RATE: 76 BPM | OXYGEN SATURATION: 98 % | DIASTOLIC BLOOD PRESSURE: 97 MMHG | BODY MASS INDEX: 32.78 KG/M2 | WEIGHT: 242 LBS | SYSTOLIC BLOOD PRESSURE: 164 MMHG | HEIGHT: 72 IN | RESPIRATION RATE: 16 BRPM

## 2024-01-09 DIAGNOSIS — M54.16 LUMBAR RADICULOPATHY: ICD-10-CM

## 2024-01-09 PROCEDURE — 95909 NRV CNDJ TST 5-6 STUDIES: CPT | Performed by: PSYCHIATRY & NEUROLOGY

## 2024-01-09 PROCEDURE — 72240 MYELOGRAPHY NECK SPINE: CPT

## 2024-01-09 PROCEDURE — 25010000002 LIDOCAINE 1 % SOLUTION: Performed by: NEUROLOGICAL SURGERY

## 2024-01-09 PROCEDURE — 62284 INJECTION FOR MYELOGRAM: CPT | Performed by: NEUROLOGICAL SURGERY

## 2024-01-09 PROCEDURE — 95886 MUSC TEST DONE W/N TEST COMP: CPT

## 2024-01-09 PROCEDURE — 72132 CT LUMBAR SPINE W/DYE: CPT

## 2024-01-09 PROCEDURE — 72120 X-RAY BEND ONLY L-S SPINE: CPT

## 2024-01-09 PROCEDURE — 95909 NRV CNDJ TST 5-6 STUDIES: CPT

## 2024-01-09 PROCEDURE — 25510000001 IOPAMIDOL 41 % SOLUTION: Performed by: NEUROLOGICAL SURGERY

## 2024-01-09 PROCEDURE — 62304 MYELOGRAPHY LUMBAR INJECTION: CPT

## 2024-01-09 PROCEDURE — 95886 MUSC TEST DONE W/N TEST COMP: CPT | Performed by: PSYCHIATRY & NEUROLOGY

## 2024-01-09 RX ORDER — LIDOCAINE HYDROCHLORIDE 10 MG/ML
5 INJECTION, SOLUTION INFILTRATION; PERINEURAL ONCE
Status: COMPLETED | OUTPATIENT
Start: 2024-01-09 | End: 2024-01-09

## 2024-01-09 RX ORDER — ONDANSETRON 4 MG/1
4 TABLET, FILM COATED ORAL ONCE AS NEEDED
Status: DISCONTINUED | OUTPATIENT
Start: 2024-01-09 | End: 2024-01-10 | Stop reason: HOSPADM

## 2024-01-09 RX ORDER — PROMETHAZINE HYDROCHLORIDE 25 MG/1
25 TABLET ORAL ONCE AS NEEDED
Status: DISCONTINUED | OUTPATIENT
Start: 2024-01-09 | End: 2024-01-10 | Stop reason: HOSPADM

## 2024-01-09 RX ORDER — IOPAMIDOL 408 MG/ML
20 INJECTION, SOLUTION INTRATHECAL
Status: COMPLETED | OUTPATIENT
Start: 2024-01-09 | End: 2024-01-09

## 2024-01-09 RX ADMIN — LIDOCAINE HYDROCHLORIDE 5 ML: 10 INJECTION, SOLUTION INFILTRATION; PERINEURAL at 08:03

## 2024-01-09 RX ADMIN — IOPAMIDOL 20 ML: 408 INJECTION, SOLUTION INTRATHECAL at 08:03

## 2024-01-09 NOTE — DISCHARGE INSTRUCTIONS
Drink plenty of fluids the next few days to help flush  contrast dye from your body.    You may shower tomorrow. Do not submerge in water until site has healed completely.    DO NOT DRIVE TODAY.

## 2024-01-09 NOTE — POST-PROCEDURE NOTE
MYELOGRAM PROCEDURE NOTE  Neurosurgery    Patient: Asim Priest  : 1965      PreOp Diagnosis: Lumbar radiculopathy    PostOp Diagnosis: Same    Procedure: Lumbar myelogram    Surgeon: Garrick    Anesthesia: 1% lidocaine    Technique:   Spinal needle: 22 gauge   Contrast: Isovue 200 (20ml)   Injection site: L L4-5    EBL: Trace    Specimens removed: None    Complication: None        Ranulfo Mccauley MD  24  07:26 EST

## 2024-01-10 ENCOUNTER — TELEPHONE (OUTPATIENT)
Dept: INFUSION THERAPY | Facility: HOSPITAL | Age: 59
End: 2024-01-10
Payer: COMMERCIAL

## 2024-01-16 ENCOUNTER — OFFICE VISIT (OUTPATIENT)
Dept: NEUROSURGERY | Facility: CLINIC | Age: 59
End: 2024-01-16
Payer: COMMERCIAL

## 2024-01-16 VITALS — HEIGHT: 72 IN | WEIGHT: 240 LBS | TEMPERATURE: 98.1 F | RESPIRATION RATE: 17 BRPM | BODY MASS INDEX: 32.51 KG/M2

## 2024-01-16 DIAGNOSIS — M51.36 DDD (DEGENERATIVE DISC DISEASE), LUMBAR: ICD-10-CM

## 2024-01-16 DIAGNOSIS — M54.9 MECHANICAL BACK PAIN: ICD-10-CM

## 2024-01-16 DIAGNOSIS — M54.16 CHRONIC LUMBAR RADICULOPATHY: Primary | ICD-10-CM

## 2024-01-16 PROCEDURE — 99213 OFFICE O/P EST LOW 20 MIN: CPT | Performed by: NEUROLOGICAL SURGERY

## 2024-01-16 RX ORDER — FLECAINIDE ACETATE 50 MG/1
1 TABLET ORAL EVERY 12 HOURS
COMMUNITY

## 2024-01-16 RX ORDER — DILTIAZEM HYDROCHLORIDE 120 MG/1
CAPSULE, COATED, EXTENDED RELEASE ORAL
COMMUNITY
Start: 2024-01-15

## 2024-01-16 NOTE — PROGRESS NOTES
Patient: Asim Priest  : 1965    Primary Care Provider: Ramakrishna Quinn MD    Requesting Provider: As above        History    Chief Complaint: Low back and left thigh pain with weakness.    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  Some of that let up but he has had bothersome pain extending into the left anterior thigh and wrapping around the knee.  He has some weakness in his left leg.  Numbness extending all the way to his toes is gone away.  Physical therapy and an inversion table have been helpful.  He has taken the gabapentin but is not sure it is working.  His major complaint has been weakness in the left leg particularly with activities such as stair climbing.  He reports no change in his symptoms.    Review of Systems   Constitutional:  Negative for activity change, appetite change, chills, diaphoresis, fatigue, fever and unexpected weight change.   HENT:  Negative for congestion, dental problem, drooling, ear discharge, ear pain, facial swelling, hearing loss, mouth sores, nosebleeds, postnasal drip, rhinorrhea, sinus pressure, sneezing, sore throat, tinnitus, trouble swallowing and voice change.    Eyes:  Negative for photophobia, pain, discharge, redness, itching and visual disturbance.   Respiratory:  Negative for apnea, cough, choking, chest tightness, shortness of breath, wheezing and stridor.    Cardiovascular:  Negative for chest pain, palpitations and leg swelling.   Gastrointestinal:  Negative for abdominal distention, abdominal pain, anal bleeding, blood in stool, constipation, diarrhea, nausea, rectal pain and vomiting.   Endocrine: Negative for cold intolerance, heat intolerance, polydipsia, polyphagia and polyuria.   Genitourinary:  Negative for decreased urine volume, difficulty urinating,  "dysuria, enuresis, flank pain, frequency, genital sores, hematuria and urgency.   Musculoskeletal:  Positive for arthralgias and back pain. Negative for gait problem, joint swelling, myalgias, neck pain and neck stiffness.   Skin:  Negative for color change, pallor, rash and wound.   Allergic/Immunologic: Negative for environmental allergies, food allergies and immunocompromised state.   Neurological:  Negative for dizziness, tremors, seizures, syncope, facial asymmetry, speech difficulty, weakness, light-headedness, numbness and headaches.   Hematological:  Negative for adenopathy. Does not bruise/bleed easily.   Psychiatric/Behavioral:  Negative for agitation, behavioral problems, confusion, decreased concentration, dysphoric mood, hallucinations, self-injury, sleep disturbance and suicidal ideas. The patient is not nervous/anxious and is not hyperactive.    All other systems reviewed and are negative.      The patient's past medical history, past surgical history, family history, and social history have been reviewed at length in the electronic medical record.      Physical Exam:   Temp 98.1 °F (36.7 °C) (Infrared)   Resp 17   Ht 182.9 cm (72\")   Wt 109 kg (240 lb)   BMI 32.55 kg/m²   Left leg extension is approximately 4/5.  Other groups are intact.    Medical Decision Making    Data Review:   (All imaging studies were personally reviewed unless stated otherwise)  MRI of the lumbar spine dated 6/22/2023 demonstrates some disc bulging at degenerative disc disease as well as some facet arthropathy.  High-grade root or canal compromise is not observed.  The radiology report talks about a 1 to 2 mm caudally directed L2-3 central disc herniation.  I am not particularly impressed with that.     Electrodiagnostic studies performed by Dr. Griffith suggest a chronic L4 radiculopathy on the left.    Lumbar CT myelogram does not demonstrate overt root compromise or evidence of instability.  There is no significant " stenosis.    Diagnosis:   Left L4 radiculopathy.  I suspect that at one point the patient had some nerve root compromise that is either gone away or is really not visible.    Treatment Options:   The patient will need time.  I would like him to increase his walking and to work on some stairclimbing to try and build up his left lower extremity.  I do not think there is any role for surgical intervention presently.  He will follow-up with neurosurgery on an as-needed basis.      Scribed for Ranulfo Mccauley MD by Shaunna Mandel CMA on 1/16/2024 11:51 EST       I, Dr. Mccauley, personally performed the services described in the documentation, as scribed in my presence, and it is both accurate and complete.

## 2024-02-19 DIAGNOSIS — F33.1 MODERATE EPISODE OF RECURRENT MAJOR DEPRESSIVE DISORDER: ICD-10-CM

## 2024-02-19 DIAGNOSIS — F41.1 GENERALIZED ANXIETY DISORDER: ICD-10-CM

## 2024-02-19 RX ORDER — SERTRALINE HYDROCHLORIDE 100 MG/1
100 TABLET, FILM COATED ORAL DAILY
Qty: 90 TABLET | Refills: 0 | OUTPATIENT
Start: 2024-02-19